# Patient Record
Sex: FEMALE | Race: WHITE | Employment: OTHER | URBAN - METROPOLITAN AREA
[De-identification: names, ages, dates, MRNs, and addresses within clinical notes are randomized per-mention and may not be internally consistent; named-entity substitution may affect disease eponyms.]

---

## 2019-07-01 ENCOUNTER — TELEPHONE (OUTPATIENT)
Dept: CARDIOLOGY | Facility: CLINIC | Age: 69
End: 2019-07-01

## 2019-07-01 NOTE — TELEPHONE ENCOUNTER
[facility] Call Center    Phone Message    May a detailed message be left on voicemail: yes    Reason for Call: Other: Patient referred for pulmonary hypertension, SOB, ref by [facility]    Action Taken: Message routed to:  [facility] Cardio

## 2019-07-05 NOTE — TELEPHONE ENCOUNTER
Contacted pt and scheduled appt.    Argentina Seth  Clinic   Pulmonary Hypertension  [facility]  (P) [phone]

## 2019-07-13 NOTE — PROGRESS NOTES
Emigdio Vann M.D.  Cardiovascular Medicine    I personally saw and examined this patient, discussed care with housestaff and other consultants, reviewed current laboratories and imaging studies, and conveyed impression and diagnostic/therapeutic plan to patient.      Jovita Glover MD   [phone] (Work)  [phone] (Fax)  [address] [place] [address]    Lennox, Shelley M, MD    [address] [place] [address] [phone] (Fax)    Yelena Tripathi MD    [address] [place] [address] [phone] (Fax)    This office note has been dictated.          Past Medical History:     Cancer (     Skin/ Basil cell     Environmental allergies     Heart palpitations 3/3/2014     Brain aneurysm    HTN     Kidney disease     Seizure      due to tetanus     Skin cancer     Stomach problems     lactose intolerance    Past Surgical History:     aneurysm coiling      APPENDECTOMY       DELIVERY     colonoscopy 14     Social History  for ice skating events      Tobacco Use     Smoking status: Former Smoker   Packs/day: 1.00   Years: 2.00   Pack years: 2.00   Types: Cigarettes   Last attempt to quit: 1971   Years since quittin.5     Smokeless tobacco: Never Used   Substance Use Topics     Alcohol use: Yes   Alcohol/week: 1.5 oz   Types: 3 Glasses of wine per week     Family History   Problem Relation Age of Onset     Hyperlipidemia Birth Father     Hypertension Birth Father     Other Birth Mother     depression/anxiety     Depression Birth Mother     Hyperlipidemia Birth Mother     Diabetes Sister     Cancer, Breast Maternal Grandmother     Cancer Brother      BCC/cheek  .    Objective  Constitutional: alert, oriented, normal gait and station, normal mentation.  Oral: moist mucous membranes  Lymph: without pathologic adenopathy  Chest: clear to ausculation and percussion  Cor: No evidence of left or right ventricular  activity.  Rhythm is regular.  S1 normal, S2 split physiologically. Murmurs are not present  Abdomen: without tenderness, rebound, guarding, masses, ascites  Extremities: Edema not present  Neuro: no focal defects, normal mentation  Skin: without open lesions  Psych: oriented, verbal, mental status in tact      Meds  Current Outpatient Medications   Medication     albuterol (PROVENTIL HFA) 108 (90 Base) MCG/ACT inhaler     aspirin (ASA) 325 MG tablet     gabapentin (NEURONTIN) 100 MG capsule     montelukast (SINGULAIR) 10 MG tablet     No current facility-administered medications for this visit.          Labs      Imaging     ECG  Ventricular Rate 65 BPM  Atrial Rate 65 BPM  P-R Interval 148 ms  QRS Duration 78 ms   ms  QTc 413 ms  P Axis -7 degrees  R Axis 20 degrees  T Axis 47 degrees  Result Narrative  Sinus rhythm  Nonspecific T wave abnormality  Abnormal ECG  When compared with ECG of 28-MAR-2014 10:12,  Nonspecific T wave abnormality now evident in Lateral leads  Agree with above Interpretation  Confirmed by MD KEYONA, BUCK SINGLETON ([id number]) on 6/24/2019 7:19:12 AM    Coronary angiogram:     Indications:       Dyspnea, unspecified, Essential (primary)                        hypertension, Palpitations, Family history                        of ischemic heart disease and other                        diseases of the circulatory system,                        Personal history of nicotine dependence         Conclusions        1.  Angiographically normal coronary arteries.         2.  Right heart cath: filling pressures are normal with         borderline/mild pulmonary hypertension.         RA mean 8mmHg        RV 37/9mmHg        PA 37/12, mean 21mmHg        PCWP mean 10mmHg     CT Scan:  EXAM: CT ANGIO CHEST W IV CONT PE STUDY  LOCATION: [facility]  DATE/TIME: 6/17/2019 7:17 PM    INDICATION: Acute onset of dyspnea on exertion.  COMPARISON: Chest CTA 8/17/2010  TECHNIQUE: Helical acquisition through the  chest was performed during the arterial phase of contrast enhancement using IV contrast. 2D and 3D reconstructions were performed by the CT technologist. Dose reduction techniques were used.   IV CONTRAST: IOHEXOL 350 MG/ML IV SOLN 100 mL    FINDINGS:  ANGIOGRAM CHEST: Enlarged central pulmonary arteries with main pulmonary artery measuring 37 mm, suggesting some degree of chronic pulmonary arterial hypertension. No evidence of acute pulmonary embolus. No thoracic aortic aneurysm/dissection.    RV/LV RATIO: N/A    LUNGS AND PLEURA: Biapical fibrosis and bibasilar fibroatelectasis. No focal mass or infiltrate. Central airways are patent.    MEDIASTINUM: No lymphadenopathy. No pleural nor pericardial effusion.    LIMITED UPPER ABDOMEN: Contrast excretion in the upper urinary tracts.  MUSCULOSKELETAL: Negative.    CONCLUSION:  1.  Chest CTA negative for acute pulmonary embolus and thoracic aortic dissection/aneurysm.  2.  Enlarged central pulmonary arteries suggesting some degree of chronic pulmonary arterial hypertension.  Other Result Information  Interface, In Rad Results - 06/17/2019  7:34 PM CDT  EXAM: CT ANGIO CHEST W IV CONT PE STUDY  LOCATION: [facility]  DATE/TIME: 6/17/2019 7:17 PM    INDICATION: Acute onset of dyspnea on exertion.  COMPARISON: Chest CTA 8/17/2010  TECHNIQUE: Helical acquisition through the chest was performed during the arterial phase of contrast enhancement using IV contrast. 2D and 3D reconstructions were performed by the CT technologist. Dose reduction techniques were used.   IV CONTRAST: IOHEXOL 350 MG/ML IV SOLN 100 mL    FINDINGS:  ANGIOGRAM CHEST: Enlarged central pulmonary arteries with main pulmonary artery measuring 37 mm, suggesting some degree of chronic pulmonary arterial hypertension. No evidence of acute pulmonary embolus. No thoracic aortic aneurysm/dissection.    RV/LV RATIO: N/A    LUNGS AND PLEURA: Biapical fibrosis and bibasilar fibroatelectasis. No focal mass or  infiltrate. Central airways are patent.    MEDIASTINUM: No lymphadenopathy. No pleural nor pericardial effusion.    LIMITED UPPER ABDOMEN: Contrast excretion in the upper urinary tracts.    MUSCULOSKELETAL: Negative.    CONCLUSION:  1.  Chest CTA negative for acute pulmonary embolus and thoracic aortic dissection/aneurysm.  2.  Enlarged central pulmonary arteries suggesting some degree of chronic pulmonary arterial hypertension      Echocardiogram:    Clinical Indications:Shortness of breath         CONCLUSION:    Echo 2019-06-17 07:20.     Normal LV and RV size and systolic function.     Mild to moderate LVH.     LVEF 60-65%.     Mild LA dilatation.     Trace to mild AR.       Left Ventricular Ejection Fraction: 60 %        ICD Codes:        Technical Quality:      Patient Vital Signs: Ht HT  68                         Ht(in):                         Wt (lb):211                         BSA:   2.14                         BP:    153  / 75            IV Information:    IV Inserted By:    IV Site:    IV Site Appearance:    IV Size:    IV Removed By:    IV Other:                     2D, Doppler and color flow Doppler study                      performed.           Measurements:    M-MODE    IVS to PW Ratio MM                1                           2D ECHO    Body Height                       68 in                     Body Weight                       211 lb                    Body Surface Area                 2.1 m                     LV Diastolic Diameter Base LX     3.9 cm                3.5-5.7    LV Systolic Diameter Base LX      2.8 cm                2.3-4.9    LV Diastolic Diameter Index       1.8 cm/m                  IVS Diastolic Thickness           1.5 cm                0.6-1.1 cm    LVPW Diastolic Thickness          1.3 cm                0.6-1.1 cm    Aorta at Sinuses Diameter         3.5 cm                    Ascending Aorta Diameter          3.5 cm                    LA Area 4C View                    22.2 cm                   LA Area 2C View                   19.6 cm               <= 20 cm     LA Volume                         67 cm                     LA Volume Index                   31.3 cm /m            16-34 cm /m       DOPPLER    AV Peak Velocity                  132 cm/s                  AV Peak Gradient                  7 mmHg                    AV Mean Gradient                  4 mmHg                    AV Velocity Time Integral         33.3 cm                   LVOT Peak Velocity                114 cm/s                  LVOT Peak Gradient                5.2 mmHg                  LVOT Mean Gradient                3 mmHg                    LVOT Velocity Time Integral       28.3 cm                   LVOT Diameter                     2.3 cm                    LVOT Area                         4.2 cm                    LVOT AV VTI Ratio                 0.85                      AV Stroke Volume                  118 cm                    AV Area Cont Eq vti               3.5 cm                    AV Area Cont Eq pk                3.6 cm                    Mitral E Point Velocity           70 cm/s                   Mitral  A Point Velocity          51.8 cm/s                 Mitral E to A Ratio               1.4                       MV Deceleration Time              349 ms                    PV Peak Velocity                  75.6 cm/s                 PV Peak Gradient                  2.3 mmHg                  LV E' Lateral Velocity            9.8 cm/s                  LV E' Septal Velocity             5.1 cm/s                  Mitral E to LV E' Lateral Ratio   7.1                       Mitral E to LV E' Septal Ratio    13.8                        COLOR DOPPLER    LVOT Diameter                     2.3 cm                    LA Area 4C View                   22.2 cm                   LA Area 2C View                   19.6 cm               <= 20 cm     LA Volume                         67 cm                      LA Volume Index                   31.3 ml/m             16-34 cm /m               Left Ventricle:     Normal LV and RV size and systolic function.                          Mild to moderate concentric LVH. No gross                         regional wall motion abnormalities identified.                         Diastolic filling pattern is indeterminate. LVEF                         60-65%.    Right Ventricle:    Normal RV size and function    Left Atrium:        Mild LA dilatation.    Right Atrium:       Normal RA size.    Aortic Valve:       Trace to mild AR.    Mitral Valve:       Trace to mild MR.    Tricuspid Valve:    Trace to mild TR.    Pulmonic Valve:     Probably normal pulmonic valve.    Aorta:              Aorta is normal in dimension proximally.    Pericardium:        No gross pericardial effusion.    IVC:                Normal IVC.    IAS:                Interatrial septum not well visualized, but                         probably intact.    3D Imaging/Contrast:Two or more contiguous regional walls were                         unable to be seen on pre-contrast images,                         therefore Optison was used on this study.                     Hernan Ji MD, FACC, FASE     (Electronically Signed)     Final Date:17 June 2019 08:38    Other Result Information   Interface, In Card And Rad Results - 06/17/2019  8:39 AM CDT   Contrast: Optison 3.0 ml     Contrast Allergy:NO      Clinical Indications:Shortness of breath      CONCLUSION:    Echo 2019-06-17 07:20.     Normal LV and RV size and systolic function.     Mild to moderate LVH.     LVEF 60-65%.     Mild LA dilatation.     Trace to mild AR.       Left Ventricular Ejection Fraction: 60 %        ICD Codes:        Technical Quality:      Patient Vital Signs: Ht HT  68                         Ht(in):                         Wt (lb):211                         BSA:   2.14                         BP:    153  / 75      IV Information:    IV Inserted  By:    IV Site:    IV Site Appearance:    IV Size:    IV Removed By:    IV Other:                     2D, Doppler and color flow Doppler study                      performed.      Measurements:    M-MODE    IVS to PW Ratio MM                1                           2D ECHO    Body Height                       68 in                     Body Weight                       211 lb                    Body Surface Area                 2.1 m                     LV Diastolic Diameter Base LX     3.9 cm                3.5-5.7    LV Systolic Diameter Base LX      2.8 cm                2.3-4.9    LV Diastolic Diameter Index       1.8 cm/m                  IVS Diastolic Thickness           1.5 cm                0.6-1.1 cm    LVPW Diastolic Thickness          1.3 cm                0.6-1.1 cm    Aorta at Sinuses Diameter         3.5 cm                    Ascending Aorta Diameter          3.5 cm                    LA Area 4C View                   22.2 cm                   LA Area 2C View                   19.6 cm               <= 20 cm     LA Volume                         67 cm                     LA Volume Index                   31.3 cm /m            16-34 cm /m       DOPPLER    AV Peak Velocity                  132 cm/s                  AV Peak Gradient                  7 mmHg                    AV Mean Gradient                  4 mmHg                    AV Velocity Time Integral         33.3 cm                   LVOT Peak Velocity                114 cm/s                  LVOT Peak Gradient                5.2 mmHg                  LVOT Mean Gradient                3 mmHg                    LVOT Velocity Time Integral       28.3 cm                   LVOT Diameter                     2.3 cm                    LVOT Area                         4.2 cm                    LVOT AV VTI Ratio                 0.85                      AV Stroke Volume                  118 cm                    AV Area Cont Eq vti               3.5 cm                     AV Area Cont Eq pk                3.6 cm                    Mitral E Point Velocity           70 cm/s                   Mitral  A Point Velocity          51.8 cm/s                 Mitral E to A Ratio               1.4                       MV Deceleration Time              349 ms                    PV Peak Velocity                  75.6 cm/s                 PV Peak Gradient                  2.3 mmHg                  LV E' Lateral Velocity            9.8 cm/s                  LV E' Septal Velocity             5.1 cm/s                  Mitral E to LV E' Lateral Ratio   7.1                       Mitral E to LV E' Septal Ratio    13.8                        COLOR DOPPLER    LVOT Diameter                     2.3 cm                    LA Area 4C View                   22.2 cm                   LA Area 2C View                   19.6 cm               <= 20 cm     LA Volume                         67 cm                     LA Volume Index                   31.3 ml/m             16-34 cm /m         Left Ventricle:     Normal LV and RV size and systolic function.                          Mild to moderate concentric LVH. No gross                         regional wall motion abnormalities identified.                         Diastolic filling pattern is indeterminate. LVEF                         60-65%.    Right Ventricle:    Normal RV size and function    Left Atrium:        Mild LA dilatation.    Right Atrium:       Normal RA size.    Aortic Valve:       Trace to mild AR.    Mitral Valve:       Trace to mild MR.    Tricuspid Valve:    Trace to mild TR.    Pulmonic Valve:     Probably normal pulmonic valve.    Aorta:              Aorta is normal in dimension proximally.    Pericardium:        No gross pericardial effusion.    IVC:                Normal IVC.    IAS:                Interatrial septum not well visualized, but                         probably intact.    3D Imaging/Contrast:Two or more contiguous  regional walls were                         unable to be seen on pre-contrast images,                         therefore Optison was used on this study.             Hernan Ji MD, FAC, DAVID     (Electronically Signed)     Final Date:17 June 2019 08:38      Stress Echocardiogram:    Contrast:Optison 3.0 ml          Contrast Allergy:NO        Indication: Shortness of breath          CONCLUSION:    Patient exercised 4 minutes 30 seconds on a Mick protocol.     Below normal functional aerobic capacity.     Blunted BP response to exercise.     Shortness of breath with exercise.     No chest pain with exercise.     Positive ECG test for diagnostic ST depression.     No echocardiographic evidence for ischemia.       Given the blunted bp response, dyspnea on exertion and ECG     changes, formal Echo and Cardiology Consultation are     recommended.

## 2019-07-16 ENCOUNTER — PRE VISIT (OUTPATIENT)
Dept: CARDIOLOGY | Facility: CLINIC | Age: 69
End: 2019-07-16

## 2019-07-16 NOTE — TELEPHONE ENCOUNTER
New Pulmonary Hypertension Patient Form   Patient Name:  Nancy Summers Age:  69F [birth year]   Referring Provider:  Dr. Lennox MRN:  [id number]   Date Test Epic Media/Scan Care Everywhere    6/18/19 RHC ?  ?   ?     6/18/19 Angio/Stress ?  ?   ?     6/17/19 Echo ?  ?   ?     6/24/19 EKG ?   ?   ?      6MWT ?   ?   ?     6/20/19 PFT ?   ?   ?     4/5/19 Sleep Study ?  ?   ?     7/8/19 Chest CT ?  ?   ?     7/8/19 V/Q Scan ?   ?   ?     4/6/00 Abd/Liver US ?   ?   ?                                                                  Patient Hx: Chronic cough, Skin cancer, Actinic keratoses, PVC's, Lactose intolerance in adult, Mild persistent asthma without complication, Tremor, Class 1 obesity, Other hyperlipidemia, Dyspnea on exertion     V/Q Normal, CHest CT: new pulmonary nodules which measure up to 5 mm, Right heart catheterization show mPAP 21, Echocardiogram looked ok.

## 2019-07-17 ENCOUNTER — OFFICE VISIT (OUTPATIENT)
Dept: CARDIOLOGY | Facility: CLINIC | Age: 69
End: 2019-07-17
Attending: INTERNAL MEDICINE
Payer: MEDICARE

## 2019-07-17 VITALS
OXYGEN SATURATION: 93 % | DIASTOLIC BLOOD PRESSURE: 76 MMHG | SYSTOLIC BLOOD PRESSURE: 119 MMHG | HEART RATE: 69 BPM | BODY MASS INDEX: 31.46 KG/M2 | WEIGHT: 212.4 LBS | HEIGHT: 69 IN

## 2019-07-17 DIAGNOSIS — Z11.59 ENCOUNTER FOR SCREENING FOR OTHER VIRAL DISEASES: ICD-10-CM

## 2019-07-17 DIAGNOSIS — I27.20 PULMONARY HYPERTENSION (H): ICD-10-CM

## 2019-07-17 DIAGNOSIS — R06.09 DYSPNEA ON EXERTION: ICD-10-CM

## 2019-07-17 DIAGNOSIS — I50.9 HEART FAILURE (H): ICD-10-CM

## 2019-07-17 DIAGNOSIS — I27.20 PULMONARY HYPERTENSION (H): Primary | ICD-10-CM

## 2019-07-17 DIAGNOSIS — I50.42 CHRONIC COMBINED SYSTOLIC AND DIASTOLIC HEART FAILURE (H): ICD-10-CM

## 2019-07-17 LAB
ALBUMIN SERPL-MCNC: 3.9 G/DL (ref 3.4–5)
ALP SERPL-CCNC: 86 U/L (ref 40–150)
ALT SERPL W P-5'-P-CCNC: 28 U/L (ref 0–50)
ANION GAP SERPL CALCULATED.3IONS-SCNC: 5 MMOL/L (ref 3–14)
AST SERPL W P-5'-P-CCNC: 18 U/L (ref 0–45)
BILIRUB DIRECT SERPL-MCNC: <0.1 MG/DL (ref 0–0.2)
BILIRUB SERPL-MCNC: 0.5 MG/DL (ref 0.2–1.3)
BUN SERPL-MCNC: 33 MG/DL (ref 7–30)
CALCIUM SERPL-MCNC: 8.8 MG/DL (ref 8.5–10.1)
CHLORIDE SERPL-SCNC: 107 MMOL/L (ref 94–109)
CHOLEST SERPL-MCNC: 183 MG/DL
CO2 SERPL-SCNC: 27 MMOL/L (ref 20–32)
CREAT SERPL-MCNC: 0.76 MG/DL (ref 0.52–1.04)
CRP SERPL-MCNC: 12.1 MG/L (ref 0–8)
ERYTHROCYTE [DISTWIDTH] IN BLOOD BY AUTOMATED COUNT: 14.5 % (ref 10–15)
GFR SERPL CREATININE-BSD FRML MDRD: 79 ML/MIN/{1.73_M2}
GLUCOSE SERPL-MCNC: 82 MG/DL (ref 70–99)
HCT VFR BLD AUTO: 43 % (ref 35–47)
HDLC SERPL-MCNC: 47 MG/DL
HGB BLD-MCNC: 13.8 G/DL (ref 11.7–15.7)
INR PPP: 0.96 (ref 0.86–1.14)
IRON SATN MFR SERPL: 22 % (ref 15–46)
IRON SERPL-MCNC: 64 UG/DL (ref 35–180)
LDLC SERPL CALC-MCNC: 81 MG/DL
MCH RBC QN AUTO: 28.8 PG (ref 26.5–33)
MCHC RBC AUTO-ENTMCNC: 32.1 G/DL (ref 31.5–36.5)
MCV RBC AUTO: 90 FL (ref 78–100)
NONHDLC SERPL-MCNC: 136 MG/DL
NT-PROBNP SERPL-MCNC: 102 PG/ML (ref 0–125)
PLATELET # BLD AUTO: 251 10E9/L (ref 150–450)
POTASSIUM SERPL-SCNC: 4 MMOL/L (ref 3.4–5.3)
PROT SERPL-MCNC: 7.3 G/DL (ref 6.8–8.8)
RBC # BLD AUTO: 4.8 10E12/L (ref 3.8–5.2)
RHEUMATOID FACT SER NEPH-ACNC: <20 IU/ML (ref 0–20)
SODIUM SERPL-SCNC: 139 MMOL/L (ref 133–144)
TIBC SERPL-MCNC: 288 UG/DL (ref 240–430)
TRIGL SERPL-MCNC: 277 MG/DL
TSH SERPL DL<=0.005 MIU/L-ACNC: 1.98 MU/L (ref 0.4–4)
WBC # BLD AUTO: 8.3 10E9/L (ref 4–11)

## 2019-07-17 PROCEDURE — 84238 ASSAY NONENDOCRINE RECEPTOR: CPT | Performed by: INTERNAL MEDICINE

## 2019-07-17 PROCEDURE — 86706 HEP B SURFACE ANTIBODY: CPT | Performed by: INTERNAL MEDICINE

## 2019-07-17 PROCEDURE — G0499 HEPB SCREEN HIGH RISK INDIV: HCPCS | Performed by: INTERNAL MEDICINE

## 2019-07-17 PROCEDURE — 00000401 ZZHCL STATISTIC THROMBIN TIME NC: Performed by: INTERNAL MEDICINE

## 2019-07-17 PROCEDURE — 86038 ANTINUCLEAR ANTIBODIES: CPT | Performed by: INTERNAL MEDICINE

## 2019-07-17 PROCEDURE — 80076 HEPATIC FUNCTION PANEL: CPT | Performed by: INTERNAL MEDICINE

## 2019-07-17 PROCEDURE — 86431 RHEUMATOID FACTOR QUANT: CPT | Performed by: INTERNAL MEDICINE

## 2019-07-17 PROCEDURE — 85027 COMPLETE CBC AUTOMATED: CPT | Performed by: INTERNAL MEDICINE

## 2019-07-17 PROCEDURE — 85610 PROTHROMBIN TIME: CPT | Performed by: INTERNAL MEDICINE

## 2019-07-17 PROCEDURE — 99204 OFFICE O/P NEW MOD 45 MIN: CPT | Mod: ZP | Performed by: INTERNAL MEDICINE

## 2019-07-17 PROCEDURE — 87389 HIV-1 AG W/HIV-1&-2 AB AG IA: CPT | Performed by: INTERNAL MEDICINE

## 2019-07-17 PROCEDURE — G0472 HEP C SCREEN HIGH RISK/OTHER: HCPCS | Performed by: INTERNAL MEDICINE

## 2019-07-17 PROCEDURE — 85730 THROMBOPLASTIN TIME PARTIAL: CPT | Performed by: INTERNAL MEDICINE

## 2019-07-17 PROCEDURE — 80061 LIPID PANEL: CPT | Performed by: INTERNAL MEDICINE

## 2019-07-17 PROCEDURE — 80048 BASIC METABOLIC PNL TOTAL CA: CPT | Performed by: INTERNAL MEDICINE

## 2019-07-17 PROCEDURE — 83520 IMMUNOASSAY QUANT NOS NONAB: CPT | Performed by: INTERNAL MEDICINE

## 2019-07-17 PROCEDURE — 86704 HEP B CORE ANTIBODY TOTAL: CPT | Performed by: INTERNAL MEDICINE

## 2019-07-17 PROCEDURE — 85613 RUSSELL VIPER VENOM DILUTED: CPT | Performed by: INTERNAL MEDICINE

## 2019-07-17 PROCEDURE — G0463 HOSPITAL OUTPT CLINIC VISIT: HCPCS | Mod: ZF

## 2019-07-17 PROCEDURE — 36415 COLL VENOUS BLD VENIPUNCTURE: CPT | Performed by: INTERNAL MEDICINE

## 2019-07-17 PROCEDURE — 83540 ASSAY OF IRON: CPT | Performed by: INTERNAL MEDICINE

## 2019-07-17 PROCEDURE — 83880 ASSAY OF NATRIURETIC PEPTIDE: CPT | Performed by: INTERNAL MEDICINE

## 2019-07-17 PROCEDURE — 83550 IRON BINDING TEST: CPT | Performed by: INTERNAL MEDICINE

## 2019-07-17 PROCEDURE — 86140 C-REACTIVE PROTEIN: CPT | Performed by: INTERNAL MEDICINE

## 2019-07-17 PROCEDURE — 84443 ASSAY THYROID STIM HORMONE: CPT | Performed by: INTERNAL MEDICINE

## 2019-07-17 RX ORDER — MONTELUKAST SODIUM 10 MG/1
10 TABLET ORAL
COMMUNITY
Start: 2019-02-19 | End: 2020-04-29

## 2019-07-17 RX ORDER — LIDOCAINE 40 MG/G
CREAM TOPICAL
Status: CANCELLED | OUTPATIENT
Start: 2019-07-17

## 2019-07-17 RX ORDER — ASPIRIN 325 MG
650 TABLET ORAL
COMMUNITY
End: 2020-04-29

## 2019-07-17 RX ORDER — GABAPENTIN 100 MG/1
CAPSULE ORAL
COMMUNITY
Start: 2019-06-24

## 2019-07-17 RX ORDER — ALBUTEROL SULFATE 90 UG/1
2 AEROSOL, METERED RESPIRATORY (INHALATION)
COMMUNITY
Start: 2019-06-18 | End: 2019-08-28

## 2019-07-17 ASSESSMENT — PAIN SCALES - GENERAL: PAINLEVEL: NO PAIN (0)

## 2019-07-17 ASSESSMENT — MIFFLIN-ST. JEOR: SCORE: 1544.88

## 2019-07-17 NOTE — PATIENT INSTRUCTIONS
Medication Changes:   No medication changes at this time. Please continue current medication regiment.    Patient Instructions:  1. Continue staying active and eat a heart healthy diet.    2. Please keep current list of medications with you at all times.    3. Remember to weigh yourself daily after voiding and before you consume any food or beverages and log the numbers.  If you have gained 2 pounds overnight or 5 pounds in a week contact us immediately for medication adjustments or further instructions.    4. **Please call us immediately if you have any syncope (fainting or passing out), chest pain, edema (swelling or weight gain), or decline in your functional status (general decline in how you are feeling).    **Labs today    Check in [address] [place] [id number]  Check-In  Time Check-In Location Estimated Length Procedure   Name     8/21/19  10:30 AM   [facility]  waiting room 60-90 minutes (4 hr total) Right Heart Catheterization with Exercise**     Procedure Preparations & Instructions     This is an invasive procedure that DOES require preparation:    - Nothing to eat for 6 hours   - You may have clear liquids up until the time of your procedure  - A ride should be arranged for you in the instance you are unable to drive home, however you should be able to function as you normally would after the procedure  **Please wear comfortable shorts for this test     Follow up Appointment Information:  8/28 at 12:00 Follow up after testing with Dr. Vann      We are located on the third floor [facility] on the [facility].  Our address is     [address] [place] [address] [place] [address] [place] [address]      Thank you for allowing us to be a part of your care here at the [facility]    If you have questions or concerns please contact us at:    Cecilia Mills, RN, BSN    Farooq Hernández or Argentina Seth (Schedule,Prior Auth)  Nurse  Coordinator     Clinic   Pulmonary Hypertension   Pulmonary Hypertension  [facility]  (P)[phone]    (P) [phone]        (F)[phone]                ** Please note that you will NOT receive a reminder call regarding your scheduled testing, reminder calls are for provider appointments only.  If you are scheduled for testing within the [facility] system you may receive a call regarding pre-registration for billing purposes only.**     Support Group:  Pulmonary Hypertension Association  Https://www.phassociation.org/  **Look at the Events Tab** They even have Support Groups that you can call into    [facility] PH Support Group  Second Saturday of the Month from 1-3 PM   Location: [address] [place] [address]  Leader: Kitty Segura and Lashae Dozier  Phone: [phone] or [phone]  Email: [email or web address]

## 2019-07-17 NOTE — NURSING NOTE
Vitals completed successfully and medication reconciled.     Yelena Contreras CMA  8:02 AM  Chief Complaint   Patient presents with     New Patient     New PH referral from Dr. Lennox

## 2019-07-17 NOTE — PROGRESS NOTES
Service Date: 2019             Shelley Lennox, MD    [facility] [address] [place] [address]      RE:    Nancy Summers   MRN:  [id number]   :  1950      Dear Laurel:      Thank you very much for allowing me to see Nancy Summers, a charming 69-year-old white female who has a fairly unremarkable medical history until May when she noticed that she was markedly more short of breath [facility] [place]  There were approximately 129 steps (personal testimony).        She has no clear-cut history of pulmonary embolism, deep venous thrombosis or risk factors for same.  She has no history of collagen vascular disease.  She has no history of liver disease or myeloproliferative syndrome.  There is no history of diet drug usage, over-the-counter medications or recreational drug abuse. She has no history of exertionally related chest pain and right and left heart catheterization with selective coronary arteriography was performed and was normal at rest.  High high-resolution CT scan showed some new small 5 mm nodules as well as apical scarring.        Her pulmonary history is significant for a prolonged upper respiratory infection while touring [place] many years ago.  Subsequent to that she experienced a chronic cough.  There is a questionable history of asthma and she has used inhalers and treatment such that her cough has dissipated with treatment.  Her CT scan did show air trapping consistent with asthma.  She has no significant history of occupational or tobacco exposure.  She has no clear-cut history of aspiration or GERD.        Interestingly, she was recently diagnosed with obstructive sleep apnea early this spring and placed on CPAP.  Coincidentally, her shortness of breath began a month later.      The very thorough workup was carried out at [facility], which was good in the sense that it disclosed no  abnormalities, however, leaves her symptoms, unexplained.  She was noted to have evidence of concentric left ventricular hypertrophy.  Given her weight, gender, age and such, this would make one suspicious for possible heart failure with preserved ejection fraction.  She has gained approximately 20 pounds over the year.  She feels that she has gained weight, around her midline.  There was no ascites noted on the CT scan.        ASSESSMENT AND PLAN:     The patient has symptoms of distinct exercise intolerance, confirmed by a Mick protocol exercise test.  However, we have not established an etiology for this.  She has no evidence of pulmonary emboli by CT scan or ventilation perfusion lung scan.  Serologies do not suggest an autoimmune or inflammatory etiology.  I suggested that she undergo right heart catheterization with EXERCISE in order to look for exercise-induced pulmonary arterial hypertension and/or a disproportionate increase in systolic blood pressure with exercise and/or evidence or indirect evidence of diastolic relaxation abnormalities.  I discussed the risks and benefits of right heart catheterization in detail.  She wishes to proceed.      Thank you very much for allowing us to see her.      Sincerely yours,         Emigdio Vann MD      cc:   Yelena Tripathi MD   [facility]   [address] [place] [address]         EMIGDIO VANN MD             D: 2019   T: 2019   MT: ROCK      Name:     JUSTIN BYRNES   MRN:      [id number]        Account:      [id number]   :      1950           Service Date: 2019      Document: [id number]

## 2019-07-17 NOTE — NURSING NOTE
Right Heart Catheterization: Patient was instructed regarding right heart catheterization, including discussion of the procedure, preparation, intra-procedural steps, and recovery at home. Patient demonstrated understanding of this information and agreed to call with further questions or concerns.  Med Reconcile: Reviewed and verified all current medications with the patient. The updated medication list was printed and given to the patient.  Diet: Patient instructed regarding a heart healthy diet, including discussion of reduced fat and sodium intake. Patient demonstrated understanding of this information and agreed to call with further questions or concerns.  Return Appointment: Patient given instructions regarding scheduling next clinic visit. Patient demonstrated understanding of this information and agreed to call with further questions or concerns.  Patient stated she understood all health information given and agreed to call with further questions or concerns.     Medication Changes:   No medication changes at this time. Please continue current medication regiment.    Patient Instructions:  1. Continue staying active and eat a heart healthy diet.    2. Please keep current list of medications with you at all times.    3. Remember to weigh yourself daily after voiding and before you consume any food or beverages and log the numbers.  If you have gained 2 pounds overnight or 5 pounds in a week contact us immediately for medication adjustments or further instructions.    4. **Please call us immediately if you have any syncope (fainting or passing out), chest pain, edema (swelling or weight gain), or decline in your functional status (general decline in how you are feeling).    **Labs today    Check-In  Time Check-In Location Estimated Length Procedure   Name        GOLD  waiting room 60-90 minutes Right Heart Catheterization with Exercise**     Procedure Preparations & Instructions     This is an invasive  procedure that DOES require preparation:    - Nothing to eat for 6 hours   - You may have clear liquids up until the time of your procedure  - A ride should be arranged for you in the instance you are unable to drive home, however you should be able to function as you normally would after the procedure  **Please wear comfortable shorts for this test     Follow up Appointment Information:  Follow up after

## 2019-07-17 NOTE — LETTER
2019      RE: Nancy Summers  [address]  [facility] [address]       Dear Colleague,    Thank you for the opportunity to participate in the care of your patient, Nancy Summers, at the [facility]. Please see a copy of my visit note below.    Emigdio Vann M.D.  Cardiovascular Medicine    I personally saw and examined this patient, discussed care with housestaff and other consultants, reviewed current laboratories and imaging studies, and conveyed impression and diagnostic/therapeutic plan to patient.      Jovita Glover MD   [phone] (Work)  [phone] (Fax)  [address] [place] [address]    Lennox, Shelley M, MD    [address] [place] [address] [phone] (Fax)    Yelena Tripathi MD    [address] [place] [address] [phone] (Fax)    This office note has been dictated.          Past Medical History:     Cancer (     Skin/ Basil cell     Environmental allergies     Heart palpitations 3/3/2014     Brain aneurysm    HTN     Kidney disease     Seizure      due to tetanus     Skin cancer     Stomach problems     lactose intolerance    Past Surgical History:     aneurysm coiling      APPENDECTOMY       DELIVERY     colonoscopy 14     Social History  for ice skating events      Tobacco Use     Smoking status: Former Smoker   Packs/day: 1.00   Years: 2.00   Pack years: 2.00   Types: Cigarettes   Last attempt to quit: 1971   Years since quittin.5     Smokeless tobacco: Never Used   Substance Use Topics     Alcohol use: Yes   Alcohol/week: 1.5 oz   Types: 3 Glasses of wine per week     Family History   Problem Relation Age of Onset     Hyperlipidemia Birth Father     Hypertension Birth Father     Other Birth Mother     depression/anxiety     Depression Birth Mother     Hyperlipidemia Birth Mother     Diabetes Sister      Cancer, Breast Maternal Grandmother     Cancer Brother      BCC/cheek  .    Objective  Constitutional: alert, oriented, normal gait and station, normal mentation.  Oral: moist mucous membranes  Lymph: without pathologic adenopathy  Chest: clear to ausculation and percussion  Cor: No evidence of left or right ventricular activity.  Rhythm is regular.  S1 normal, S2 split physiologically. Murmurs are not present  Abdomen: without tenderness, rebound, guarding, masses, ascites  Extremities: Edema not present  Neuro: no focal defects, normal mentation  Skin: without open lesions  Psych: oriented, verbal, mental status in tact      Meds  Current Outpatient Medications   Medication     albuterol (PROVENTIL HFA) 108 (90 Base) MCG/ACT inhaler     aspirin (ASA) 325 MG tablet     gabapentin (NEURONTIN) 100 MG capsule     montelukast (SINGULAIR) 10 MG tablet     No current facility-administered medications for this visit.          Labs      Imaging     ECG  Ventricular Rate 65 BPM  Atrial Rate 65 BPM  P-R Interval 148 ms  QRS Duration 78 ms   ms  QTc 413 ms  P Axis -7 degrees  R Axis 20 degrees  T Axis 47 degrees  Result Narrative  Sinus rhythm  Nonspecific T wave abnormality  Abnormal ECG  When compared with ECG of 28-MAR-2014 10:12,  Nonspecific T wave abnormality now evident in Lateral leads  Agree with above Interpretation  Confirmed by MD KEYONA, BUCK SINGLETON ([id number]) on 6/24/2019 7:19:12 AM    Coronary angiogram:     Indications:       Dyspnea, unspecified, Essential (primary)                        hypertension, Palpitations, Family history                        of ischemic heart disease and other                        diseases of the circulatory system,                        Personal history of nicotine dependence         Conclusions        1.  Angiographically normal coronary arteries.         2.  Right heart cath: filling pressures are normal with         borderline/mild pulmonary hypertension.         RA mean  8mmHg        RV 37/9mmHg        PA 37/12, mean 21mmHg        PCWP mean 10mmHg     CT Scan:  EXAM: CT ANGIO CHEST W IV CONT PE STUDY  LOCATION: [facility]  DATE/TIME: 6/17/2019 7:17 PM    INDICATION: Acute onset of dyspnea on exertion.  COMPARISON: Chest CTA 8/17/2010  TECHNIQUE: Helical acquisition through the chest was performed during the arterial phase of contrast enhancement using IV contrast. 2D and 3D reconstructions were performed by the CT technologist. Dose reduction techniques were used.   IV CONTRAST: IOHEXOL 350 MG/ML IV SOLN 100 mL    FINDINGS:  ANGIOGRAM CHEST: Enlarged central pulmonary arteries with main pulmonary artery measuring 37 mm, suggesting some degree of chronic pulmonary arterial hypertension. No evidence of acute pulmonary embolus. No thoracic aortic aneurysm/dissection.    RV/LV RATIO: N/A    LUNGS AND PLEURA: Biapical fibrosis and bibasilar fibroatelectasis. No focal mass or infiltrate. Central airways are patent.    MEDIASTINUM: No lymphadenopathy. No pleural nor pericardial effusion.    LIMITED UPPER ABDOMEN: Contrast excretion in the upper urinary tracts.  MUSCULOSKELETAL: Negative.    CONCLUSION:  1.  Chest CTA negative for acute pulmonary embolus and thoracic aortic dissection/aneurysm.  2.  Enlarged central pulmonary arteries suggesting some degree of chronic pulmonary arterial hypertension.  Other Result Information  Interface, In Rad Results - 06/17/2019  7:34 PM CDT  EXAM: CT ANGIO CHEST W IV CONT PE STUDY  LOCATION: [facility]  DATE/TIME: 6/17/2019 7:17 PM    INDICATION: Acute onset of dyspnea on exertion.  COMPARISON: Chest CTA 8/17/2010  TECHNIQUE: Helical acquisition through the chest was performed during the arterial phase of contrast enhancement using IV contrast. 2D and 3D reconstructions were performed by the CT technologist. Dose reduction techniques were used.   IV CONTRAST: IOHEXOL 350 MG/ML IV SOLN 100 mL    FINDINGS:  ANGIOGRAM CHEST: Enlarged central  pulmonary arteries with main pulmonary artery measuring 37 mm, suggesting some degree of chronic pulmonary arterial hypertension. No evidence of acute pulmonary embolus. No thoracic aortic aneurysm/dissection.    RV/LV RATIO: N/A    LUNGS AND PLEURA: Biapical fibrosis and bibasilar fibroatelectasis. No focal mass or infiltrate. Central airways are patent.    MEDIASTINUM: No lymphadenopathy. No pleural nor pericardial effusion.    LIMITED UPPER ABDOMEN: Contrast excretion in the upper urinary tracts.    MUSCULOSKELETAL: Negative.    CONCLUSION:  1.  Chest CTA negative for acute pulmonary embolus and thoracic aortic dissection/aneurysm.  2.  Enlarged central pulmonary arteries suggesting some degree of chronic pulmonary arterial hypertension      Echocardiogram:    Clinical Indications:Shortness of breath         CONCLUSION:    Echo 2019-06-17 07:20.     Normal LV and RV size and systolic function.     Mild to moderate LVH.     LVEF 60-65%.     Mild LA dilatation.     Trace to mild AR.       Left Ventricular Ejection Fraction: 60 %        ICD Codes:        Technical Quality:      Patient Vital Signs: Ht HT  68                         Ht(in):                         Wt (lb):211                         BSA:   2.14                         BP:    153  / 75            IV Information:    IV Inserted By:    IV Site:    IV Site Appearance:    IV Size:    IV Removed By:    IV Other:                     2D, Doppler and color flow Doppler study                      performed.           Measurements:    M-MODE    IVS to PW Ratio MM                1                           2D ECHO    Body Height                       68 in                     Body Weight                       211 lb                    Body Surface Area                 2.1 m                     LV Diastolic Diameter Base LX     3.9 cm                3.5-5.7    LV Systolic Diameter Base LX      2.8 cm                2.3-4.9    LV Diastolic Diameter Index       1.8  cm/m                  IVS Diastolic Thickness           1.5 cm                0.6-1.1 cm    LVPW Diastolic Thickness          1.3 cm                0.6-1.1 cm    Aorta at Sinuses Diameter         3.5 cm                    Ascending Aorta Diameter          3.5 cm                    LA Area 4C View                   22.2 cm                   LA Area 2C View                   19.6 cm               <= 20 cm     LA Volume                         67 cm                     LA Volume Index                   31.3 cm /m            16-34 cm /m       DOPPLER    AV Peak Velocity                  132 cm/s                  AV Peak Gradient                  7 mmHg                    AV Mean Gradient                  4 mmHg                    AV Velocity Time Integral         33.3 cm                   LVOT Peak Velocity                114 cm/s                  LVOT Peak Gradient                5.2 mmHg                  LVOT Mean Gradient                3 mmHg                    LVOT Velocity Time Integral       28.3 cm                   LVOT Diameter                     2.3 cm                    LVOT Area                         4.2 cm                    LVOT AV VTI Ratio                 0.85                      AV Stroke Volume                  118 cm                    AV Area Cont Eq vti               3.5 cm                    AV Area Cont Eq pk                3.6 cm                    Mitral E Point Velocity           70 cm/s                   Mitral  A Point Velocity          51.8 cm/s                 Mitral E to A Ratio               1.4                       MV Deceleration Time              349 ms                    PV Peak Velocity                  75.6 cm/s                 PV Peak Gradient                  2.3 mmHg                  LV E' Lateral Velocity            9.8 cm/s                  LV E' Septal Velocity             5.1 cm/s                  Mitral E to LV E' Lateral Ratio   7.1                       Mitral E to  LV E' Septal Ratio    13.8                        COLOR DOPPLER    LVOT Diameter                     2.3 cm                    LA Area 4C View                   22.2 cm                   LA Area 2C View                   19.6 cm               <= 20 cm     LA Volume                         67 cm                     LA Volume Index                   31.3 ml/m             16-34 cm /m               Left Ventricle:     Normal LV and RV size and systolic function.                          Mild to moderate concentric LVH. No gross                         regional wall motion abnormalities identified.                         Diastolic filling pattern is indeterminate. LVEF                         60-65%.    Right Ventricle:    Normal RV size and function    Left Atrium:        Mild LA dilatation.    Right Atrium:       Normal RA size.    Aortic Valve:       Trace to mild AR.    Mitral Valve:       Trace to mild MR.    Tricuspid Valve:    Trace to mild TR.    Pulmonic Valve:     Probably normal pulmonic valve.    Aorta:              Aorta is normal in dimension proximally.    Pericardium:        No gross pericardial effusion.    IVC:                Normal IVC.    IAS:                Interatrial septum not well visualized, but                         probably intact.    3D Imaging/Contrast:Two or more contiguous regional walls were                         unable to be seen on pre-contrast images,                         therefore Optison was used on this study.                     Hernan Ji MD, FACC, FASE     (Electronically Signed)     Final Date:17 June 2019 08:38    Other Result Information   Interface, In Card And Rad Results - 06/17/2019  8:39 AM CDT   Contrast: Optison 3.0 ml     Contrast Allergy:NO      Clinical Indications:Shortness of breath      CONCLUSION:    Echo 2019-06-17 07:20.     Normal LV and RV size and systolic function.     Mild to moderate LVH.     LVEF 60-65%.     Mild LA dilatation.     Trace to  mild AR.       Left Ventricular Ejection Fraction: 60 %        ICD Codes:        Technical Quality:      Patient Vital Signs: Ht HT  68                         Ht(in):                         Wt (lb):211                         BSA:   2.14                         BP:    153  / 75      IV Information:    IV Inserted By:    IV Site:    IV Site Appearance:    IV Size:    IV Removed By:    IV Other:                     2D, Doppler and color flow Doppler study                      performed.      Measurements:    M-MODE    IVS to PW Ratio MM                1                           2D ECHO    Body Height                       68 in                     Body Weight                       211 lb                    Body Surface Area                 2.1 m                     LV Diastolic Diameter Base LX     3.9 cm                3.5-5.7    LV Systolic Diameter Base LX      2.8 cm                2.3-4.9    LV Diastolic Diameter Index       1.8 cm/m                  IVS Diastolic Thickness           1.5 cm                0.6-1.1 cm    LVPW Diastolic Thickness          1.3 cm                0.6-1.1 cm    Aorta at Sinuses Diameter         3.5 cm                    Ascending Aorta Diameter          3.5 cm                    LA Area 4C View                   22.2 cm                   LA Area 2C View                   19.6 cm               <= 20 cm     LA Volume                         67 cm                     LA Volume Index                   31.3 cm /m            16-34 cm /m       DOPPLER    AV Peak Velocity                  132 cm/s                  AV Peak Gradient                  7 mmHg                    AV Mean Gradient                  4 mmHg                    AV Velocity Time Integral         33.3 cm                   LVOT Peak Velocity                114 cm/s                  LVOT Peak Gradient                5.2 mmHg                  LVOT Mean Gradient                3 mmHg                    LVOT Velocity Time  Integral       28.3 cm                   LVOT Diameter                     2.3 cm                    LVOT Area                         4.2 cm                    LVOT AV VTI Ratio                 0.85                      AV Stroke Volume                  118 cm                    AV Area Cont Eq vti               3.5 cm                    AV Area Cont Eq pk                3.6 cm                    Mitral E Point Velocity           70 cm/s                   Mitral  A Point Velocity          51.8 cm/s                 Mitral E to A Ratio               1.4                       MV Deceleration Time              349 ms                    PV Peak Velocity                  75.6 cm/s                 PV Peak Gradient                  2.3 mmHg                  LV E' Lateral Velocity            9.8 cm/s                  LV E' Septal Velocity             5.1 cm/s                  Mitral E to LV E' Lateral Ratio   7.1                       Mitral E to LV E' Septal Ratio    13.8                        COLOR DOPPLER    LVOT Diameter                     2.3 cm                    LA Area 4C View                   22.2 cm                   LA Area 2C View                   19.6 cm               <= 20 cm     LA Volume                         67 cm                     LA Volume Index                   31.3 ml/m             16-34 cm /m         Left Ventricle:     Normal LV and RV size and systolic function.                          Mild to moderate concentric LVH. No gross                         regional wall motion abnormalities identified.                         Diastolic filling pattern is indeterminate. LVEF                         60-65%.    Right Ventricle:    Normal RV size and function    Left Atrium:        Mild LA dilatation.    Right Atrium:       Normal RA size.    Aortic Valve:       Trace to mild AR.    Mitral Valve:       Trace to mild MR.    Tricuspid Valve:    Trace to mild TR.    Pulmonic Valve:     Probably normal  pulmonic valve.    Aorta:              Aorta is normal in dimension proximally.    Pericardium:        No gross pericardial effusion.    IVC:                Normal IVC.    IAS:                Interatrial septum not well visualized, but                         probably intact.    3D Imaging/Contrast:Two or more contiguous regional walls were                         unable to be seen on pre-contrast images,                         therefore Optison was used on this study.             Hernan Ji MD, FAC, FASE     (Electronically Signed)     Final Date:2019 08:38      Stress Echocardiogram:    Contrast:Optison 3.0 ml          Contrast Allergy:NO        Indication: Shortness of breath          CONCLUSION:    Patient exercised 4 minutes 30 seconds on a Mick protocol.     Below normal functional aerobic capacity.     Blunted BP response to exercise.     Shortness of breath with exercise.     No chest pain with exercise.     Positive ECG test for diagnostic ST depression.     No echocardiographic evidence for ischemia.       Given the blunted bp response, dyspnea on exertion and ECG     changes, formal Echo and Cardiology Consultation are     recommended.        Service Date: 2019             Shelley Lennox, MD    [facility] [address] [place] [address]      RE:    Nancy Summers   MRN:  [id number]   :  1950      Dear Laurel:      Thank you very much for allowing me to see Nancy Summers, a charming 69-year-old white female who has a fairly unremarkable medical history until May when she noticed that she was markedly more short of breath walking [place].  There were approximately 129 steps (personal testimony).        She has no clear-cut history of pulmonary embolism, deep venous thrombosis or risk factors for same.  She has no history of collagen vascular disease.  She has no history of liver  disease or myeloproliferative syndrome.  There is no history of diet drug usage, over-the-counter medications or recreational drug abuse. She has no history of exertionally related chest pain and right and left heart catheterization with selective coronary arteriography was performed and was normal at rest.  High high-resolution CT scan showed some new small 5 mm nodules as well as apical scarring.        Her pulmonary history is significant for a prolonged upper respiratory infection while touring [place] many years ago.  Subsequent to that she experienced a chronic cough.  There is a questionable history of asthma and she has used inhalers and treatment such that her cough has dissipated with treatment.  Her CT scan did show air trapping consistent with asthma.  She has no significant history of occupational or tobacco exposure.  She has no clear-cut history of aspiration or GERD.        Interestingly, she was recently diagnosed with obstructive sleep apnea early this spring and placed on CPAP.  Coincidentally, her shortness of breath began a month later.      The very thorough workup was carried out at [facility], which was good in the sense that it disclosed no abnormalities, however, leaves her symptoms, unexplained.  She was noted to have evidence of concentric left ventricular hypertrophy.  Given her weight, gender, age and such, this would make one suspicious for possible heart failure with preserved ejection fraction.  She has gained approximately 20 pounds over the year.  She feels that she has gained weight, around her midline.  There was no ascites noted on the CT scan.        ASSESSMENT AND PLAN:     The patient has symptoms of distinct exercise intolerance, confirmed by a Mick protocol exercise test.  However, we have not established an etiology for this.  She has no evidence of pulmonary emboli by CT scan or ventilation perfusion lung scan.  Serologies do not suggest an autoimmune or inflammatory  etiology.  I suggested that she undergo right heart catheterization with EXERCISE in order to look for exercise-induced pulmonary arterial hypertension and/or a disproportionate increase in systolic blood pressure with exercise and/or evidence or indirect evidence of diastolic relaxation abnormalities.  I discussed the risks and benefits of right heart catheterization in detail.  She wishes to proceed.      Thank you very much for allowing us to see her.      Sincerely yours,         Arminda Vann MD      cc:   Yelena Tripathi MD   [facility]   [address] [place] [address]         ARMINDA VANN MD             D: 2019   T: 2019   MT: ROCK      Name:     JUSTIN BYRNES   MRN:      [id number]        Account:      [id number]   :      1950           Service Date: 2019      Document: [id number]

## 2019-07-18 LAB
ANA SER QL IF: NEGATIVE
HBV CORE AB SERPL QL IA: NONREACTIVE
HBV SURFACE AB SERPL IA-ACNC: 0 M[IU]/ML
HBV SURFACE AG SERPL QL IA: NONREACTIVE
HCV AB SERPL QL IA: NONREACTIVE
HIV 1+2 AB+HIV1 P24 AG SERPL QL IA: NONREACTIVE
LA PPP-IMP: NEGATIVE
STFR SERPL-SCNC: 2.6 MG/L (ref 1.9–4.4)

## 2019-07-19 LAB — IL6 SERPL-MCNC: 1.92 PG/ML

## 2019-08-13 DIAGNOSIS — R79.9 ABNORMAL FINDING OF BLOOD CHEMISTRY: ICD-10-CM

## 2019-08-13 DIAGNOSIS — I27.20 PULMONARY HYPERTENSION (H): Primary | ICD-10-CM

## 2019-08-21 ENCOUNTER — HOSPITAL ENCOUNTER (OUTPATIENT)
Facility: CLINIC | Age: 69
Discharge: HOME | End: 2019-08-21
Attending: INTERNAL MEDICINE | Admitting: INTERNAL MEDICINE
Payer: MEDICARE

## 2019-08-21 ENCOUNTER — APPOINTMENT (OUTPATIENT)
Dept: MEDSURG UNIT | Facility: CLINIC | Age: 69
End: 2019-08-21
Attending: INTERNAL MEDICINE
Payer: MEDICARE

## 2019-08-21 VITALS
HEIGHT: 68 IN | SYSTOLIC BLOOD PRESSURE: 177 MMHG | DIASTOLIC BLOOD PRESSURE: 76 MMHG | BODY MASS INDEX: 32.13 KG/M2 | WEIGHT: 212 LBS | RESPIRATION RATE: 20 BRPM | HEART RATE: 80 BPM | OXYGEN SATURATION: 94 %

## 2019-08-21 DIAGNOSIS — I27.20 PULMONARY HYPERTENSION (H): ICD-10-CM

## 2019-08-21 DIAGNOSIS — R79.9 ABNORMAL FINDING OF BLOOD CHEMISTRY: ICD-10-CM

## 2019-08-21 LAB
CHOLEST SERPL-MCNC: 181 MG/DL
CRP SERPL-MCNC: 15 MG/L (ref 0–8)
HDLC SERPL-MCNC: 43 MG/DL
LDLC SERPL CALC-MCNC: 91 MG/DL
NONHDLC SERPL-MCNC: 138 MG/DL
TRIGL SERPL-MCNC: 236 MG/DL

## 2019-08-21 PROCEDURE — 93464 EXERCISE W/HEMODYNAMIC MEAS: CPT | Mod: 26 | Performed by: INTERNAL MEDICINE

## 2019-08-21 PROCEDURE — 93451 RIGHT HEART CATH: CPT | Performed by: INTERNAL MEDICINE

## 2019-08-21 PROCEDURE — 27210788 ZZH MANIFOLD CR3: Performed by: INTERNAL MEDICINE

## 2019-08-21 PROCEDURE — C1894 INTRO/SHEATH, NON-LASER: HCPCS | Performed by: INTERNAL MEDICINE

## 2019-08-21 PROCEDURE — 80061 LIPID PANEL: CPT | Performed by: INTERNAL MEDICINE

## 2019-08-21 PROCEDURE — 25000125 ZZHC RX 250: Performed by: INTERNAL MEDICINE

## 2019-08-21 PROCEDURE — 86140 C-REACTIVE PROTEIN: CPT | Performed by: INTERNAL MEDICINE

## 2019-08-21 PROCEDURE — 93451 RIGHT HEART CATH: CPT | Mod: 26 | Performed by: INTERNAL MEDICINE

## 2019-08-21 PROCEDURE — 36415 COLL VENOUS BLD VENIPUNCTURE: CPT | Performed by: INTERNAL MEDICINE

## 2019-08-21 PROCEDURE — 27210794 ZZH OR GENERAL SUPPLY STERILE: Performed by: INTERNAL MEDICINE

## 2019-08-21 PROCEDURE — 93464 EXERCISE W/HEMODYNAMIC MEAS: CPT | Performed by: INTERNAL MEDICINE

## 2019-08-21 PROCEDURE — 40000166 ZZH STATISTIC PP CARE STAGE 1

## 2019-08-21 RX ORDER — LIDOCAINE 40 MG/G
CREAM TOPICAL
Status: COMPLETED | OUTPATIENT
Start: 2019-08-21 | End: 2019-08-21

## 2019-08-21 RX ADMIN — LIDOCAINE: 40 CREAM TOPICAL at 11:08

## 2019-08-21 ASSESSMENT — MIFFLIN-ST. JEOR: SCORE: 1535.13

## 2019-08-21 NOTE — IP AVS SNAPSHOT
MRN:[id number]                      After Visit Summary   8/21/2019    Nancy Summers    MRN: [id number]           Visit Information        Department      8/21/2019 10:37 AM [address] [facility]          Review of your medicines      UNREVIEWED medicines. Ask your doctor about these medicines       Dose / Directions   aspirin 325 MG tablet  Commonly known as:  ASA      Dose:  650 mg  Take 650 mg by mouth  Refills:  0     gabapentin 100 MG capsule  Commonly known as:  NEURONTIN      TAKE 1 CAPSULE BY MOUTH AT BEDTIME AS NEEDED.  Refills:  0     montelukast 10 MG tablet  Commonly known as:  SINGULAIR      Dose:  10 mg  Take 10 mg by mouth  Refills:  0     PROVENTIL  (90 Base) MCG/ACT inhaler  Generic drug:  albuterol      Dose:  2 puff  Inhale 2 puffs into the lungs  Refills:  0              Protect others around you: Learn how to safely use, store and throw away your medicines at www.disposemymeds.org.       Follow-ups after your visit       Your next 10 appointments already scheduled    Aug 21, 2019  Procedure with Ana Lilia Sung MD  [facility], [facility],  Heart Cath Lab ([facility], [facility]) [address] [place] [id number] [phone]   [facility] is located on the corner of [facility] and [facility] on the [facility]. It is easily accessible from virtually any point in the [place] area, via [address] and [address].   Aug 28, 2019 12:00 PM CDT  (Arrive by 11:45 AM)  RETURN PRIMARY PULMONARY with Emigdio Vann MD  [facility] ([facility]) [address] [place] [address]  [address] [place] [id number] [phone]         Care Instructions       Further instructions from your care team       [facility]                        Interventional Cardiology  Discharge Instructions   Post Right Heart Cath      AFTER YOU GO HOME:    DO drink plenty  of fluids    DO resume your regular diet and medications unless otherwise instructed by your Primary Physician    Do Not scrub the procedure site vigorously    No lotion or powder to the puncture site for 3 days    CALL YOUR PRIMARY PHYSICIAN IF: You may resume all normal activity.  Monitor neck site for bleeding, swelling, or voice changes. If you notice bleeding or swelling immediately apply pressure to the site and call number below to speak with Cardiology Fellow.  If you experience any changes in your breathing you should call your doctor immediately or come to the closest Emergency Department.  Do not drive yourself.    ADDITIONAL INSTRUCTIONS: Medications: You are to resume all home medications including anticoagulation therapy unless otherwise advised by your primary cardiologist or nurse coordinator.    Follow Up: Per your primary cardiology team    If you have any questions or concerns regarding your procedure site please call [phone] at anytime and ask for Cardiology Fellow on call.  They are available 24 hours a day.  You may also contact the Cardiology Clinic after hours number at [phone].                                                       Telephone Numbers [phone] Monday-Friday 8:00 am to 4:30 pm    [phone] After 4:30 pm Monday-Friday, Weekends & Holidays  Ask for Interventional Cardiologist on call. Someone is on call 24 hours/day   [facility] toll free number [phone] Monday-Friday 8:00 am to 4:30 pm   [facility] Emergency Dept [phone]                   Additional Information About Your Visit       [facility] Information    [facility] gives you secure access to your electronic health record. If you see a primary care provider, you can also send messages to your care team and make appointments. If you have questions, please call your primary care clinic.  If you do not have a primary care provider, please call [phone] and they will assist you.       Care EveryWhere  ID    This is your Care EveryWhere ID. This could be used by other organizations to access your [facility] medical records  [id number]        Primary Care Provider Office Phone # Fax #    Yelena Tripathi [phone]      Equal Access to Services    NEDRAColorado River Medical CenterARIANA : Hadii funmilayo vizcaino hadasho Soomaali, waaxda luqadaha, qaybta kaalmada adeegyada, waxlukasz altman haybrian reisvikashoa serrano. So [place] [phone].    ATENCIÓN: Si habla español, tiene a lopez disposición servicios gratuitos de asistencia lingüística. Llame al [phone].    We comply with applicable federal civil rights laws and [place] laws. We do not discriminate on the basis of race, color, national origin, age, disability, sex, sexual orientation, or gender identity.           Thank you!    Thank you for choosing [facility] for your care. Our goal is always to provide you with excellent care. Hearing back from our patients is one way we can continue to improve our services. Please take a few minutes to complete the written survey that you may receive in the mail after you visit with us. Thank you!            Medication List      ASK your doctor about these medications          Morning Afternoon Evening Bedtime As Needed    aspirin 325 MG tablet  Also known as:  ASA  INSTRUCTIONS:  Take 650 mg by mouth                     gabapentin 100 MG capsule  Also known as:  NEURONTIN  INSTRUCTIONS:  TAKE 1 CAPSULE BY MOUTH AT BEDTIME AS NEEDED.                     montelukast 10 MG tablet  Also known as:  SINGULAIR  INSTRUCTIONS:  Take 10 mg by mouth                     PROVENTIL  (90 Base) MCG/ACT inhaler  INSTRUCTIONS:  Inhale 2 puffs into the lungs  Generic drug:  albuterol

## 2019-08-21 NOTE — IP AVS SNAPSHOT
[address] [address]                                    After Visit Summary   8/21/2019    Nancy Summers    MRN: [id number]           After Visit Summary Signature Page    I have received my discharge instructions, and my questions have been answered. I have discussed any challenges I see with this plan with the nurse or doctor.    ..........................................................................................................................................  Patient/Patient Representative Signature      ..........................................................................................................................................  Patient Representative Print Name and Relationship to Patient    ..................................................               ................................................  Date                                   Time    ..........................................................................................................................................  Reviewed by Signature/Title    ...................................................              ..............................................  Date                                               Time          22EPIC Rev 08/18

## 2019-08-21 NOTE — Clinical Note
Potential access sites were evaluated for patency using ultrasound.   The right jugular vein was selected. Access was obtained under with Sonosite and Fluoroscopic guidance using a micropuncture 21 guage needle with direct visualization of needle entry.

## 2019-08-21 NOTE — DISCHARGE INSTRUCTIONS
[facility]                        Interventional Cardiology  Discharge Instructions   Post Right Heart Cath      AFTER YOU GO HOME:    DO drink plenty of fluids    DO resume your regular diet and medications unless otherwise instructed by your Primary Physician    Do Not scrub the procedure site vigorously    No lotion or powder to the puncture site for 3 days    CALL YOUR PRIMARY PHYSICIAN IF: You may resume all normal activity.  Monitor neck site for bleeding, swelling, or voice changes. If you notice bleeding or swelling immediately apply pressure to the site and call number below to speak with Cardiology Fellow.  If you experience any changes in your breathing you should call your doctor immediately or come to the closest Emergency Department.  Do not drive yourself.    ADDITIONAL INSTRUCTIONS: Medications: You are to resume all home medications including anticoagulation therapy unless otherwise advised by your primary cardiologist or nurse coordinator.    Follow Up: Per your primary cardiology team    If you have any questions or concerns regarding your procedure site please call [phone] at anytime and ask for Cardiology Fellow on call.  They are available 24 hours a day.  You may also contact the Cardiology Clinic after hours number at [phone].                                                       Telephone Numbers [phone] Monday-Friday 8:00 am to 4:30 pm    [phone] After 4:30 pm Monday-Friday, Weekends & Holidays  Ask for Interventional Cardiologist on call. Someone is on call 24 hours/day   [facility] toll free number [phone] Monday-Friday 8:00 am to 4:30 pm   [facility] Emergency Dept [phone]

## 2019-08-21 NOTE — PROGRESS NOTES
Returned from [facility] at 1350.  States she got short of breath with exercise portion of test, but is back to baseline now.  Patient tolerated recovery stage well. VSS, RIJV site clean/dry/intact, no hematoma, and denies pain. Patient tolerated PO food and fluids. Teaching was done and discharge instructions were given.  Patient discharged from the hospital via ambulatory to home with her family.

## 2019-08-22 ASSESSMENT — ENCOUNTER SYMPTOMS
DYSPNEA ON EXERTION: 1
SYNCOPE: 0
LIGHT-HEADEDNESS: 0
COUGH DISTURBING SLEEP: 0
LEG PAIN: 0
HYPERTENSION: 1
EXERCISE INTOLERANCE: 1
POSTURAL DYSPNEA: 0
NECK PAIN: 0
WHEEZING: 0
SNORES LOUDLY: 0
SPUTUM PRODUCTION: 0
ORTHOPNEA: 0
COUGH: 0
PALPITATIONS: 0
SLEEP DISTURBANCES DUE TO BREATHING: 0
JOINT SWELLING: 0
BACK PAIN: 0
SHORTNESS OF BREATH: 1
MYALGIAS: 0
ARTHRALGIAS: 1
HYPOTENSION: 0
MUSCLE WEAKNESS: 0
STIFFNESS: 1
HEMOPTYSIS: 0
MUSCLE CRAMPS: 0

## 2019-08-27 NOTE — PROGRESS NOTES
Service Date: 2019             Shelley Lennox, MD    [facility] [address] [place] [address]      RE:    Nancy Summers   MRN:  [id number]   :  1950      Dear Laurel:      Thank you very much for allowing me to see Nancy Summers, a charming 69-year-old white female who has a fairly unremarkable medical history until May when she noticed that she was markedly more short [facility] [place]  There were approximately 129 steps (personal testimony).        She has no clear-cut history of pulmonary embolism, deep venous thrombosis or risk factors for same.  She has no history of collagen vascular disease.  She has no history of liver disease or myeloproliferative syndrome.  There is no history of diet drug usage, over-the-counter medications or recreational drug abuse. She has no history of exertionally related chest pain and right and left heart catheterization with selective coronary arteriography was performed and was normal at rest.  High high-resolution CT scan showed some new small 5 mm nodules as well as apical scarring.        Her pulmonary history is significant for a prolonged upper respiratory infection while touring [place] many years ago.  Subsequent to that she experienced a chronic cough.  There is a questionable history of asthma and she has used inhalers and treatment such that her cough has dissipated with treatment.  Her CT scan did show air trapping consistent with asthma.  She has no significant history of occupational or tobacco exposure.  She has no clear-cut history of aspiration or GERD.        Interestingly, she was recently diagnosed with obstructive sleep apnea early this spring and placed on CPAP.  Coincidentally, her shortness of breath began a month later.      The very thorough workup was carried out at [facility], which was good in the sense that it disclosed no  abnormalities, however, leaves her symptoms, unexplained.  She was noted to have evidence of concentric left ventricular hypertrophy.  Given her weight, gender, age and such, this would make one suspicious for possible heart failure with preserved ejection fraction.  She has gained approximately 20 pounds over the year.  She feels that she has gained weight, around her midline.  There was no ascites noted on the CT scan.      Wt Readings from Last 24 Encounters:   19 96.2 kg (212 lb)   19 96.2 kg (212 lb)   19 96.3 kg (212 lb 6.4 oz)     Current Outpatient Medications   Medication     aspirin (ASA) 325 MG tablet     gabapentin (NEURONTIN) 100 MG capsule     montelukast (SINGULAIR) 10 MG tablet     No current facility-administered medications for this visit.          ASSESSMENT AND PLAN:       The heart catheriztion demonstrates modest at best PH, however there is a marked increase in PA mean pressure and marked symptoms.  The PCP rises dramatically with exertions, consistent with exercise associated PH associated with HFpEF.    Discussed HFpEF treatment and trial of PH medication: weight loss, exercise increase, treatment of ZAINAB, tadalafil 20 day,  Discussed with patient.        Thank you very much for allowing us to see her.      Sincerely yours,         Arminda Vann MD      cc:   Yelena Tripathi MD   [facility]   [address] [place] [address]         ARMINDA VANN MD             D: 2019   T: 2019   MT: [place]      Name:     JUSTIN BYRNES   MRN:      [id number]        Account:      [id number]   :      1950           Service Date: 2019      Document: [id number]      Answers for HPI/ROS submitted by the patient on 2019   General Symptoms: No  Skin Symptoms: No  HENT Symptoms: No  EYE SYMPTOMS: No  HEART SYMPTOMS: Yes  LUNG SYMPTOMS: Yes  INTESTINAL SYMPTOMS: No  URINARY SYMPTOMS: No  GYNECOLOGIC SYMPTOMS: No  BREAST SYMPTOMS:  No  SKELETAL SYMPTOMS: Yes  BLOOD SYMPTOMS: No  NERVOUS SYSTEM SYMPTOMS: No  MENTAL HEALTH SYMPTOMS: No  Cough: No  Sputum or phlegm: No  Coughing up blood: No  Difficulty breating or shortness of breath: Yes  Snoring: No  Wheezing: No  Difficulty breathing on exertion: Yes  Nighttime Cough: No  Difficulty breathing when lying flat: No  Chest pain or pressure: No  Fast or irregular heartbeat: No  Pain in legs with walking: No  Trouble breathing while lying down: No  Fingers or toes appear blue: No  High blood pressure: Yes  Low blood pressure: No  Fainting: No  Pacemaker: No  Varicose veins: No  Edema or swelling: Yes  Wake up at night with shortness of breath: No  Light-headedness: No  Exercise intolerance: Yes  Back pain: No  Muscle aches: No  Neck pain: No  Swollen joints: No  Joint pain: Yes  Bone pain: No  Muscle cramps: No  Muscle weakness: No  Joint stiffness: Yes  Bone fracture: No

## 2019-08-28 ENCOUNTER — OFFICE VISIT (OUTPATIENT)
Dept: CARDIOLOGY | Facility: CLINIC | Age: 69
End: 2019-08-28
Attending: INTERNAL MEDICINE
Payer: MEDICARE

## 2019-08-28 VITALS
OXYGEN SATURATION: 96 % | WEIGHT: 212 LBS | HEART RATE: 67 BPM | BODY MASS INDEX: 31.4 KG/M2 | DIASTOLIC BLOOD PRESSURE: 71 MMHG | HEIGHT: 69 IN | SYSTOLIC BLOOD PRESSURE: 139 MMHG

## 2019-08-28 DIAGNOSIS — I27.20 PULMONARY HYPERTENSION (H): Primary | ICD-10-CM

## 2019-08-28 DIAGNOSIS — R06.09 DYSPNEA ON EXERTION: ICD-10-CM

## 2019-08-28 PROCEDURE — 99214 OFFICE O/P EST MOD 30 MIN: CPT | Mod: ZP | Performed by: INTERNAL MEDICINE

## 2019-08-28 PROCEDURE — G0463 HOSPITAL OUTPT CLINIC VISIT: HCPCS | Mod: ZF

## 2019-08-28 ASSESSMENT — MIFFLIN-ST. JEOR: SCORE: 1543.07

## 2019-08-28 ASSESSMENT — PAIN SCALES - GENERAL: PAINLEVEL: NO PAIN (0)

## 2019-08-28 NOTE — NURSING NOTE
New Medication: Patient was educated regarding newly prescribed medication, including discussion of  the indication, administration, side effects, and when to report to MD or RN. Patient demonstrated understanding of this information and agreed to call with further questions or concerns.    Labs: Patient was given results of the laboratory testing obtained today. Patient demonstrated understanding of this information and agreed to call with further questions or concerns.     Diet: Patient instructed regarding a heart healthy diet, including discussion of reduced fat and sodium intake. Patient demonstrated understanding of this information and agreed to call with further questions or concerns.    Return Appointment: Patient given instructions regarding scheduling next clinic visit. Patient demonstrated understanding of this information and agreed to call with further questions or concerns.    Patient stated she understood all health information given and agreed to call with further questions or concerns.    Medication Changes:   - Start on Adcirca (tadalafil) 20mg daily for two weeks. Call us a few days before you run out of medication and let us know how you are feeling.     Patient Instructions:  1. Continue staying active and eat a heart healthy diet.    2. Please keep current list of medications with you at all times.    3. Remember to weigh yourself daily after voiding and before you consume any food or beverages and log the numbers.  If you have gained 2 pounds overnight or 5 pounds in a week contact us immediately for medication adjustments or further instructions.    4. **Please call us immediately if you have any syncope (fainting or passing out), chest pain, edema (swelling or weight gain), or decline in your functional status (general decline in how you are feeling).    Follow up Appointment Information:  - Dr. Vann would like you to exercise 6 times a week for a goal weight loss of 2lbs a week  -  Follow up with Dr. Young with labs prior in 6-8 weeks    Results:  - Right heart catheterization results reviewed

## 2019-08-28 NOTE — PATIENT INSTRUCTIONS
Medication Changes:   - Start on Adcirca (tadalafil) 20mg daily for two weeks. Call us a few days before you run out of medication and let us know how you are feeling.     Patient Instructions:  1. Continue staying active and eat a heart healthy diet.    2. Please keep current list of medications with you at all times.    3. Remember to weigh yourself daily after voiding and before you consume any food or beverages and log the numbers.  If you have gained 2 pounds overnight or 5 pounds in a week contact us immediately for medication adjustments or further instructions.    4. **Please call us immediately if you have any syncope (fainting or passing out), chest pain, edema (swelling or weight gain), or decline in your functional status (general decline in how you are feeling).    Follow up Appointment Information:  - Dr. Vann would like you to exercise 6 times a week for a goal weight loss of 2lbs a week  - Follow up with Dr. Young with labs prior in 6-8 weeks    Results:  - Right heart catheterization results reviewed  We are located on the third floor of the [facility] on the [facility].  Our address is     [address] [place] [address] on [address]rd [place] [address]      Thank you for allowing us to be a part of your care here at the [facility]    If you have questions or concerns please contact us at:    Cecilia Mills RN, BSN    Argentina Seth (Schedule,Prior Auth)  Nurse Coordinator     Clinic   Pulmonary Hypertension   Pulmonary Hypertension  [facility]  (Phone)[phone]   (Phone) [phone]        (Fax)[phone]                ** Please note that you will NOT receive a reminder call regarding your scheduled testing, reminder calls are for provider appointments only.  If you are scheduled for testing within the [facility] system you may receive  a call regarding pre-registration for billing purposes only.**     Support Group:  Pulmonary Hypertension Association  Https://www.phassociation.org/  **Look at the Events Tab** They even have Support Groups that you can call into    [facility] PH Support Group  Second Saturday of the Month from 1-3 PM   Location: [address] [place] [address]  Leader: Kitty Dozier  Phone: [phone] or [phone]  Email: [email or web address]

## 2019-08-28 NOTE — NURSING NOTE
Chief Complaint   Patient presents with     Follow Up     Rtn Prim Pulm     Vitals were taken and medications were reconciled.   Janell Enriquez  12:03 PM

## 2019-08-28 NOTE — LETTER
2019      RE: Nancy Summers  [address]  [facility] [place] [facility] [facility] [address]       Dear Colleague,    Thank you for the opportunity to participate in the care of your patient, Nancy Summers, at the [facility]. Please see a copy of my visit note below.    Service Date: 2019          Shelley Lennox, MD    [facility] [address] [place] [address]      RE:    Nancy Summers   MRN:  [id number]   :  1950      Dear Laurel:      Thank you very much for allowing me to see Nancy Summers, a charming 69-year-old white female who has a fairly unremarkable medical history until May when she noticed that she was markedly more short of breath walking [place] from [place] the [place].  There were approximately 129 steps (personal testimony).        She has no clear-cut history of pulmonary embolism, deep venous thrombosis or risk factors for same.  She has no history of collagen vascular disease.  She has no history of liver disease or myeloproliferative syndrome.  There is no history of diet drug usage, over-the-counter medications or recreational drug abuse. She has no history of exertionally related chest pain and right and left heart catheterization with selective coronary arteriography was performed and was normal at rest.  High high-resolution CT scan showed some new small 5 mm nodules as well as apical scarring.        Her pulmonary history is significant for a prolonged upper respiratory infection while touring [place] many years ago.  Subsequent to that she experienced a chronic cough.  There is a questionable history of asthma and she has used inhalers and treatment such that her cough has dissipated with treatment.  Her CT scan did show air trapping consistent with asthma.  She has no significant history of occupational or tobacco exposure.  She has no clear-cut  history of aspiration or GERD.        Interestingly, she was recently diagnosed with obstructive sleep apnea early this spring and placed on CPAP.  Coincidentally, her shortness of breath began a month later.      The very thorough workup was carried out at [facility], which was good in the sense that it disclosed no abnormalities, however, leaves her symptoms, unexplained.  She was noted to have evidence of concentric left ventricular hypertrophy.  Given her weight, gender, age and such, this would make one suspicious for possible heart failure with preserved ejection fraction.  She has gained approximately 20 pounds over the year.  She feels that she has gained weight, around her midline.  There was no ascites noted on the CT scan.      Wt Readings from Last 24 Encounters:   19 96.2 kg (212 lb)   19 96.2 kg (212 lb)   19 96.3 kg (212 lb 6.4 oz)     Current Outpatient Medications   Medication     aspirin (ASA) 325 MG tablet     gabapentin (NEURONTIN) 100 MG capsule     montelukast (SINGULAIR) 10 MG tablet     No current facility-administered medications for this visit.          ASSESSMENT AND PLAN:       The heart catheriztion demonstrates modest at best PH, however there is a marked increase in PA mean pressure and marked symptoms.  The PCP rises dramatically with exertions, consistent with exercise associated PH associated with HFpEF.    Discussed HFpEF treatment and trial of PH medication: weight loss, exercise increase, treatment of ZAINAB, tadalafil 20 day,  Discussed with patient.        Thank you very much for allowing us to see her.      Sincerely yours,         Arminda Vann MD      cc:   Yelena Tripathi MD   [facility]   [address] [place] [address]         ARMINDA VANN MD             D: 2019   T: 2019   MT: [place]      Name:     JUSTIN BYRNES   MRN:      [id number]        Account:      [id number]   :      1950           Service Date:  07/17/2019      Document: [id number]

## 2019-09-01 NOTE — TELEPHONE ENCOUNTER
Brief cardiology cross cover note    Nancy called because she just started tadalafil 20mg daily on Wednesday and since has been having terrible muscle aches. We discussed that it is a rare side effect of the medicine and if it is not tolerable she could stop the tadalafil. I am routing the message to her team so they are aware of the side effect and can follow up on her symptoms.     Yulissa Brewer MD  September 1, 2019  2:30 PM

## 2019-09-04 ENCOUNTER — TELEPHONE (OUTPATIENT)
Dept: CARDIOLOGY | Facility: CLINIC | Age: 69
End: 2019-09-04

## 2019-09-04 NOTE — TELEPHONE ENCOUNTER
I called and spoke with pt and she stated that she went to an orthopedic urgent care and they stated that she has a pinched nerve.  She wants to continue taking the Adcirca (tadalafil) 20 mg daily and is seeking medical attention for the pinched nerve.  I will follow up with her next week on how she is feeling. Cecilia Mills RN on 9/4/2019 at 8:18 AM

## 2019-09-04 NOTE — TELEPHONE ENCOUNTER
I called Dr. Tripathi regarding the pts elevated lipid panal.  I also faxed the results to them Fax: [phone].  They will follow up on any intervention. Cecilia Mills RN on 9/4/2019 at 11:30 AM

## 2019-09-11 ENCOUNTER — TELEPHONE (OUTPATIENT)
Dept: CARDIOLOGY | Facility: CLINIC | Age: 69
End: 2019-09-11

## 2019-09-11 ENCOUNTER — PATIENT OUTREACH (OUTPATIENT)
Dept: CARDIOLOGY | Facility: CLINIC | Age: 69
End: 2019-09-11

## 2019-09-11 DIAGNOSIS — I27.20 PULMONARY HYPERTENSION (H): Primary | ICD-10-CM

## 2019-09-11 NOTE — TELEPHONE ENCOUNTER
PA Initiation    Medication: Tadalafil 20 mg  Insurance Company: [employer] - Phone [phone] Fax [phone]  Start Date: 9/11/2019    *Prior authorization completed and submitted through CoverMyMeds.

## 2019-09-11 NOTE — PROGRESS NOTES
I called pt to follow up on how she is tolerating the Adcirca (tadalafil) 20 mg Daily.  She states that it is going great. She is not having any side effects and may be feeling a little better.  Cecilia Mills RN on 9/11/2019 at 10:41 AM

## 2019-09-16 NOTE — TELEPHONE ENCOUNTER
Prior Authorization Approval    Authorization Effective Date: 12/30/2018  Authorization Expiration Date: 12/31/2019  Medication: Tadalafil 20 mg - Approved  Approved Dose/Quantity: 60 tablets/30 days  Reference #: [id number]  Insurance Company: [employer] - Phone [phone] Fax [phone]    BIN: [id number]; PCN: [id number]; MEM ID#: [id number]

## 2019-09-17 RX ORDER — TADALAFIL 20 MG/1
20 TABLET ORAL DAILY
Qty: 60 TABLET | Refills: 11 | Status: SHIPPED | OUTPATIENT
Start: 2019-09-17 | End: 2019-10-23

## 2019-09-17 NOTE — TELEPHONE ENCOUNTER
Prescription sent and patient called notifying her of approval.  She will call us and let us know if she has any co-payment issues. Cecilia Mills RN on 9/17/2019 at 11:10 AM    Pt called stating her copay was $430.  She applied to the [employer] and was approved for $5300. Cecilia Mills RN on 9/18/2019 at 10:49 AM

## 2019-10-20 ASSESSMENT — ENCOUNTER SYMPTOMS
COUGH: 1
BLOOD IN STOOL: 1
BACK PAIN: 1

## 2019-10-23 ENCOUNTER — OFFICE VISIT (OUTPATIENT)
Dept: CARDIOLOGY | Facility: CLINIC | Age: 69
End: 2019-10-23
Attending: INTERNAL MEDICINE
Payer: MEDICARE

## 2019-10-23 VITALS
BODY MASS INDEX: 29.12 KG/M2 | DIASTOLIC BLOOD PRESSURE: 77 MMHG | HEIGHT: 68 IN | HEART RATE: 51 BPM | OXYGEN SATURATION: 95 % | SYSTOLIC BLOOD PRESSURE: 115 MMHG | WEIGHT: 192.1 LBS

## 2019-10-23 DIAGNOSIS — I27.20 PULMONARY HYPERTENSION (H): ICD-10-CM

## 2019-10-23 DIAGNOSIS — R06.09 DYSPNEA ON EXERTION: ICD-10-CM

## 2019-10-23 LAB
ANION GAP SERPL CALCULATED.3IONS-SCNC: 7 MMOL/L (ref 3–14)
BUN SERPL-MCNC: 27 MG/DL (ref 7–30)
CALCIUM SERPL-MCNC: 8.8 MG/DL (ref 8.5–10.1)
CHLORIDE SERPL-SCNC: 108 MMOL/L (ref 94–109)
CO2 SERPL-SCNC: 26 MMOL/L (ref 20–32)
CREAT SERPL-MCNC: 0.8 MG/DL (ref 0.52–1.04)
ERYTHROCYTE [DISTWIDTH] IN BLOOD BY AUTOMATED COUNT: 14.6 % (ref 10–15)
GFR SERPL CREATININE-BSD FRML MDRD: 75 ML/MIN/{1.73_M2}
GLUCOSE SERPL-MCNC: 89 MG/DL (ref 70–99)
HCT VFR BLD AUTO: 40.2 % (ref 35–47)
HGB BLD-MCNC: 13.1 G/DL (ref 11.7–15.7)
MCH RBC QN AUTO: 28.9 PG (ref 26.5–33)
MCHC RBC AUTO-ENTMCNC: 32.6 G/DL (ref 31.5–36.5)
MCV RBC AUTO: 89 FL (ref 78–100)
NT-PROBNP SERPL-MCNC: 141 PG/ML (ref 0–125)
PLATELET # BLD AUTO: 220 10E9/L (ref 150–450)
POTASSIUM SERPL-SCNC: 4.1 MMOL/L (ref 3.4–5.3)
RBC # BLD AUTO: 4.53 10E12/L (ref 3.8–5.2)
SODIUM SERPL-SCNC: 140 MMOL/L (ref 133–144)
WBC # BLD AUTO: 8.2 10E9/L (ref 4–11)

## 2019-10-23 PROCEDURE — 85027 COMPLETE CBC AUTOMATED: CPT | Performed by: INTERNAL MEDICINE

## 2019-10-23 PROCEDURE — 99215 OFFICE O/P EST HI 40 MIN: CPT | Mod: ZP | Performed by: INTERNAL MEDICINE

## 2019-10-23 PROCEDURE — 80048 BASIC METABOLIC PNL TOTAL CA: CPT | Performed by: INTERNAL MEDICINE

## 2019-10-23 PROCEDURE — G0463 HOSPITAL OUTPT CLINIC VISIT: HCPCS | Mod: ZF

## 2019-10-23 PROCEDURE — 36415 COLL VENOUS BLD VENIPUNCTURE: CPT | Performed by: INTERNAL MEDICINE

## 2019-10-23 PROCEDURE — 83880 ASSAY OF NATRIURETIC PEPTIDE: CPT | Performed by: INTERNAL MEDICINE

## 2019-10-23 RX ORDER — TADALAFIL 20 MG/1
40 TABLET ORAL DAILY
Qty: 60 TABLET | Refills: 11 | Status: SHIPPED | OUTPATIENT
Start: 2019-10-23

## 2019-10-23 RX ORDER — ACETAMINOPHEN 500 MG
500 TABLET ORAL DAILY
COMMUNITY
Start: 2019-08-28 | End: 2020-04-29

## 2019-10-23 ASSESSMENT — PAIN SCALES - GENERAL: PAINLEVEL: NO PAIN (0)

## 2019-10-23 ASSESSMENT — MIFFLIN-ST. JEOR: SCORE: 1444.86

## 2019-10-23 NOTE — LETTER
10/23/2019      RE: Nancy Summers  [address]  [place] [address]       Dear Colleague,    Thank you for the opportunity to participate in the care of your patient, Nancy Summers, at the [facility]. Please see a copy of my visit note below.      2019       Shelley Lennox, MD    [facility] [address] [place] [address]      RE:    Nancy Summers   MRN:  [id number]   :  1950      Dear Laurel:      Thank you very much for allowing me to see Nancy Summers, a charming 69-year-old white female who has a fairly unremarkable medical history until May when she noticed that she was markedly more short of breath walking up the stairs from downtown [place] up the [place].  There were approximately 129 steps (personal testimony).        She has no clear-cut history of pulmonary embolism, deep venous thrombosis or risk factors for same.  She has no history of collagen vascular disease.  She has no history of liver disease or myeloproliferative syndrome.  There is no history of diet drug usage, over-the-counter medications or recreational drug abuse. She has no history of exertionally related chest pain and right and left heart catheterization with selective coronary arteriography was performed and was normal at rest.  High high-resolution CT scan showed some new small 5 mm nodules as well as apical scarring.        Her pulmonary history is significant for a prolonged upper respiratory infection while touring [place] many years ago.  Subsequent to that she experienced a chronic cough.  There is a questionable history of asthma and she has used inhalers and treatment such that her cough has dissipated with treatment.  Her CT scan did show air trapping consistent with asthma.  She has no significant history of occupational or tobacco exposure.  She has no clear-cut history of aspiration or GERD.         Interestingly, she was recently diagnosed with obstructive sleep apnea early this spring and placed on CPAP.  Coincidentally, her shortness of breath began a month later.      The very thorough workup was carried out at [facility], which was good in the sense that it disclosed no abnormalities, however, leaves her symptoms, unexplained.  She was noted to have evidence of concentric left ventricular hypertrophy.  Given her weight, gender, age and such, this would make one suspicious for possible heart failure with preserved ejection fraction.  She has gained approximately 20 pounds over the year.  She feels that she has gained weight, around her midline.  There was no ascites noted on the CT scan.      Wt Readings from Last 24 Encounters:   10/23/19 87.1 kg (192 lb 1.6 oz)   08/28/19 96.2 kg (212 lb)   08/21/19 96.2 kg (212 lb)   07/17/19 96.3 kg (212 lb 6.4 oz)     Current Outpatient Medications   Medication     acetaminophen (TYLENOL) 500 MG tablet     aspirin (ASA) 325 MG tablet     gabapentin (NEURONTIN) 100 MG capsule     tadalafil, PAH, 20 MG TABS     montelukast (SINGULAIR) 10 MG tablet     No current facility-administered medications for this visit.            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 Mild elevated Pulmonary Hypertension.Normal cardiac output level.   Pressures Phase: Baseline      Time Systolic Diastolic Mean A Wave V Wave EDP Max dp/dt HR   RA Pressures  1:10 PM   4 mmHg    0 mmHg     0 mmHg      64 bpm      RV Pressures 12:52 PM       486 mmHg/sec         1:11 PM 40 mmHg    3 mmHg       5 mmHg     66 bpm      PA Pressures  1:11 PM 40 mmHg    15 mmHg    26 mmHg        60 bpm      PCW Pressures  1:12 PM   16 mmHg    16 mmHg    22 mmHg      61 bpm      Pressures Phase: Post Exercise      Time Systolic Diastolic Mean A Wave V Wave EDP Max dp/dt HR   RA Pressures  1:35 PM   15 mmHg    15 mmHg    22 mmHg      121 bpm      PA Pressures  1:35 PM 66 mmHg    38 mmHg    48 mmHg        129 bpm      PCW Pressures  1:35 PM   38 mmHg    38 mmHg    48 mmHg      121 bpm      Blood Oximetry Phase: Post Exercise      Time Hb SAT(%) PO2 Content PA Sat   PA  1:31 PM  36.1 %      36.1 %      Art  1:31 PM  85 %     16.76 mL/dL       Cardiac Output Phase: Baseline      Time TDCO TDCI Dorota C.O. Dorota C.I. Dorota HR   Cardiac Output Results 12:52 PM 6.97 L/min    3.34 L/min/m2           1:18 PM 6.97 L/min          Resistance Results Phase: Baseline      Time PVR SVR PVR-I SVR-I TPR TVR TPR-I TVR-I PVR/SVR TPR/TVR   Resistance Results (Metric) 12:52 .8 dsc-5     239.63 dsc-5/m2     298.49 dsc-5     623.05 dsc-5/m2         Resistance Results (Wood) 12:52 PM 1.44 COSTA     3 COSTA/m2     3.73 COSTA     7.79 COSTA/m2         Resistance Results Phase: Post Exercise      Time PVR SVR PVR-I SVR-I TPR TVR TPR-I TVR-I PVR/SVR TPR/TVR   Resistance Results (Metric)  1:31 PM 68.95 dsc-5     143.92 dsc-5/m2     330.95 dsc-5     690.8 dsc-5/m2         Resistance Results (Wood)  1:31 PM 0.86 COSTA     1.8 COSTA                 ASSESSMENT AND PLAN:       The heart catheriztion demonstrates modest at best PH, however there is a marked increase in PA mean pressure and marked symptoms.  The PCP rises dramatically with exertions, consistent with exercise associated PH associated with HFpEF.    Discussed HFpEF treatment and trial of PH medication: weight loss, exercise increase, treatment of ZAINAB, tadalafil 20 day,  Discussed with patient.        Thank you  very much for allowing us to see her.      Sincerely yours,         Emigdio Vann MD      cc:   Yelena Tripathi MD   [facility]   [address] [place] [address]     Shelly Lennox, M.D.  Pulmonary   [facility]        EMIGDIO VANN MD

## 2019-10-23 NOTE — PATIENT INSTRUCTIONS
Medication Changes:   - Increase your tadalafil to 40mg daily on Sunday. Call us if you have any adverse side effects.     Patient Instructions:  1. Continue staying active and eat a heart healthy diet.    2. Please keep current list of medications with you at all times.    3. Remember to weigh yourself daily after voiding and before you consume any food or beverages and log the numbers.  If you have gained 2 pounds overnight or 5 pounds in a week contact us immediately for medication adjustments or further instructions.    4. **Please call us immediately if you have any syncope (fainting or passing out), chest pain, edema (swelling or weight gain), or decline in your functional status (general decline in how you are feeling).    Follow up Appointment Information:  - Follow up with Dr. Vann in April/May with an Echo and labs prior  - Call us if you copay is too high with the increased dosing. Use GoodRx if needed    Check-In  Time Check-In Location Estimated Length Procedure   Name         60 minutes Echocardiogram (Echo)**   Procedure Preparations & Instructions     This is a non-invasive procedure and does NOT require any preparation       Results:  Component      Latest Ref Rng & Units 10/23/2019   Sodium      133 - 144 mmol/L 140   Potassium      3.4 - 5.3 mmol/L 4.1   Chloride      94 - 109 mmol/L 108   Carbon Dioxide      20 - 32 mmol/L 26   Anion Gap      3 - 14 mmol/L 7   Glucose      70 - 99 mg/dL 89   Urea Nitrogen      7 - 30 mg/dL 27   Creatinine      0.52 - 1.04 mg/dL 0.80   GFR Estimate      >60 mL/min/1.73:m2 75   GFR Estimate If Black      >60 mL/min/1.73:m2 87   Calcium      8.5 - 10.1 mg/dL 8.8   WBC      4.0 - 11.0 10e9/L 8.2   RBC Count      3.8 - 5.2 10e12/L 4.53   Hemoglobin      11.7 - 15.7 g/dL 13.1   Hematocrit      35.0 - 47.0 % 40.2   MCV      78 - 100 fl 89   MCH      26.5 - 33.0 pg 28.9   MCHC      31.5 - 36.5 g/dL 32.6   RDW      10.0 - 15.0 % 14.6   Platelet Count      150 - 450  10e9/L 220   N-Terminal Pro Bnp      0 - 125 pg/mL 141 (H)     We are located on the third floor of [facility] the [facility].  Our address is     [address] [place] [address] on [address]rd [place] [address]      Thank you for allowing us to be a part of your care here at the [facility]    If you have questions or concerns please contact us at:    Cecilia Mills, RN, BSN    Argentina Seth (Schedule,Prior Auth)  Nurse Coordinator     Clinic   Pulmonary Hypertension   Pulmonary Hypertension  [facility]  (Phone)[phone]   (Phone) [phone]        (Fax)[phone]                ** Please note that you will NOT receive a reminder call regarding your scheduled testing, reminder calls are for provider appointments only.  If you are scheduled for testing within the [facility] system you may receive a call regarding pre-registration for billing purposes only.**     Support Group:  Pulmonary Hypertension Association  Https://www.phassociation.org/  **Look at the Events Tab** They even have Support Groups that you can call into    [facility] PH Support Group  Second Saturday of the Month from 1-3 PM   Location: [address] [place] [id number]  Leader: Kitty Segura and Lashae Dozier  Phone: [phone] or [phone]  Email: [email or web address]

## 2019-10-23 NOTE — NURSING NOTE
Procedures and/or Testing: Patient given instructions regarding  Echocardiogram, . Discussed purpose, preparation, procedure and when to expect results reported back to the patient. Patient demonstrated understanding of this information and agreed to call with further questions or concerns.    Medication Change: Patient was educated regarding prescribed medication change, including discussion of the indication, administration, side effects, and when to report to MD or RN. Patient demonstrated understanding of this information and agreed to call with further questions or concerns.    Labs: Patient was given results of the laboratory testing obtained today. Patient demonstrated understanding of this information and agreed to call with further questions or concerns.     Diet: Patient instructed regarding a heart healthy diet, including discussion of reduced fat and sodium intake. Patient demonstrated understanding of this information and agreed to call with further questions or concerns.    Return Appointment: Patient given instructions regarding scheduling next clinic visit. Patient demonstrated understanding of this information and agreed to call with further questions or concerns.    Patient stated she understood all health information given and agreed to call with further questions or concerns.    Medication Changes:   - Increase your tadalafil to 40mg daily on Sunday. Call us if you have any adverse side effects.     Patient Instructions:  1. Continue staying active and eat a heart healthy diet.    2. Please keep current list of medications with you at all times.    3. Remember to weigh yourself daily after voiding and before you consume any food or beverages and log the numbers.  If you have gained 2 pounds overnight or 5 pounds in a week contact us immediately for medication adjustments or further instructions.    4. **Please call us immediately if you have any syncope (fainting or passing out), chest pain,  edema (swelling or weight gain), or decline in your functional status (general decline in how you are feeling).    Follow up Appointment Information:  - Follow up with Dr. Vann in April/May with an Echo and labs prior  - Call us if you copay is too high with the increased dosing. Use GoodRx if needed    Check-In  Time Check-In Location Estimated Length Procedure   Name         60 minutes Echocardiogram (Echo)**   Procedure Preparations & Instructions     This is a non-invasive procedure and does NOT require any preparation       Results:  Component      Latest Ref Rng & Units 10/23/2019   Sodium      133 - 144 mmol/L 140   Potassium      3.4 - 5.3 mmol/L 4.1   Chloride      94 - 109 mmol/L 108   Carbon Dioxide      20 - 32 mmol/L 26   Anion Gap      3 - 14 mmol/L 7   Glucose      70 - 99 mg/dL 89   Urea Nitrogen      7 - 30 mg/dL 27   Creatinine      0.52 - 1.04 mg/dL 0.80   GFR Estimate      >60 mL/min/1.73:m2 75   GFR Estimate If Black      >60 mL/min/1.73:m2 87   Calcium      8.5 - 10.1 mg/dL 8.8   WBC      4.0 - 11.0 10e9/L 8.2   RBC Count      3.8 - 5.2 10e12/L 4.53   Hemoglobin      11.7 - 15.7 g/dL 13.1   Hematocrit      35.0 - 47.0 % 40.2   MCV      78 - 100 fl 89   MCH      26.5 - 33.0 pg 28.9   MCHC      31.5 - 36.5 g/dL 32.6   RDW      10.0 - 15.0 % 14.6   Platelet Count      150 - 450 10e9/L 220   N-Terminal Pro Bnp      0 - 125 pg/mL 141 (H)     **Patient will call if her copay is too high with the increased dose of tadalafil. Staff message sent to Cecilia to follow up with patient. Judi Ovalle RN on 10/23/2019 at 12:59 PM

## 2019-10-23 NOTE — NURSING NOTE
Vitals completed successfully and medication reconciled.     Yelena Contreras, JULI  12:02 PM  Chief Complaint   Patient presents with     Follow Up      Return for PH F/U (Recently started on Adcirca (tadalafil))

## 2019-10-23 NOTE — PROGRESS NOTES
2019       Shelley Lennox, MD    [facility]   [address] [place] [address]      RE:    Nancy Summers   MRN:  [id number]   :  1950      Dear Laurel:      Thank you very much for allowing me to see Nancy Summers, a charming 69-year-old white female who has a fairly unremarkable medical history until May when she noticed that she was markedly more short [place] breath [place] [facility] [place]  [place] were approximately 129 steps (personal testimony).        She has no clear-cut history of pulmonary embolism, deep venous thrombosis or risk factors for same.  She has no history of collagen vascular disease.  She has no history of liver disease or myeloproliferative syndrome.  There is no history of diet drug usage, over-the-counter medications or recreational drug abuse. She has no history of exertionally related chest pain and right and left heart catheterization with selective coronary arteriography was performed and was normal at rest.  High high-resolution CT scan showed some new small 5 mm nodules as well as apical scarring.        Her pulmonary history is significant for a prolonged upper respiratory infection while touring [place] many years ago.  Subsequent to that she experienced a chronic cough.  There is a questionable history of asthma and she has used inhalers and treatment such that her cough has dissipated with treatment.  Her CT scan did show air trapping consistent with asthma.  She has no significant history of occupational or tobacco exposure.  She has no clear-cut history of aspiration or GERD.        Interestingly, she was recently diagnosed with obstructive sleep apnea early this spring and placed on CPAP.  Coincidentally, her shortness of breath began a month later.      The very thorough workup was carried out at [facility], which was good in the sense that it disclosed no abnormalities, however, leaves her  symptoms, unexplained.  She was noted to have evidence of concentric left ventricular hypertrophy.  Given her weight, gender, age and such, this would make one suspicious for possible heart failure with preserved ejection fraction.  She has gained approximately 20 pounds over the year.  She feels that she has gained weight, around her midline.  There was no ascites noted on the CT scan.      Wt Readings from Last 24 Encounters:   10/23/19 87.1 kg (192 lb 1.6 oz)   08/28/19 96.2 kg (212 lb)   08/21/19 96.2 kg (212 lb)   07/17/19 96.3 kg (212 lb 6.4 oz)     Current Outpatient Medications   Medication     acetaminophen (TYLENOL) 500 MG tablet     aspirin (ASA) 325 MG tablet     gabapentin (NEURONTIN) 100 MG capsule     tadalafil, PAH, 20 MG TABS     montelukast (SINGULAIR) 10 MG tablet     No current facility-administered medications for this visit.            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 Mild elevated Pulmonary Hypertension.Normal cardiac output level.   Pressures Phase: Baseline      Time Systolic Diastolic Mean A Wave V Wave EDP Max dp/dt HR   RA Pressures  1:10 PM   4 mmHg    0 mmHg    0 mmHg      64 bpm      RV Pressures 12:52 PM       486 mmHg/sec         1:11 PM 40 mmHg    3 mmHg       5 mmHg     66 bpm      PA Pressures  1:11 PM 40 mmHg    15 mmHg    26 mmHg        60 bpm      PCW Pressures  1:12 PM   16 mmHg    16 mmHg    22 mmHg      61 bpm      Pressures Phase: Post Exercise      Time Systolic  Diastolic Mean A Wave V Wave EDP Max dp/dt HR   RA Pressures  1:35 PM   15 mmHg    15 mmHg    22 mmHg      121 bpm      PA Pressures  1:35 PM 66 mmHg    38 mmHg    48 mmHg        129 bpm      PCW Pressures  1:35 PM   38 mmHg    38 mmHg    48 mmHg      121 bpm      Blood Oximetry Phase: Post Exercise      Time Hb SAT(%) PO2 Content PA Sat   PA  1:31 PM  36.1 %      36.1 %      Art  1:31 PM  85 %     16.76 mL/dL       Cardiac Output Phase: Baseline      Time TDCO TDCI Dorota C.O. Dorota C.I. Dorota HR   Cardiac Output Results 12:52 PM 6.97 L/min    3.34 L/min/m2           1:18 PM 6.97 L/min          Resistance Results Phase: Baseline      Time PVR SVR PVR-I SVR-I TPR TVR TPR-I TVR-I PVR/SVR TPR/TVR   Resistance Results (Metric) 12:52 .8 dsc-5     239.63 dsc-5/m2     298.49 dsc-5     623.05 dsc-5/m2         Resistance Results (Wood) 12:52 PM 1.44 COSTA     3 COSTA/m2     3.73 COSTA     7.79 COSTA/m2         Resistance Results Phase: Post Exercise      Time PVR SVR PVR-I SVR-I TPR TVR TPR-I TVR-I PVR/SVR TPR/TVR   Resistance Results (Metric)  1:31 PM 68.95 dsc-5     143.92 dsc-5/m2     330.95 dsc-5     690.8 dsc-5/m2         Resistance Results (Wood)  1:31 PM 0.86 COSTA     1.8 COSTA                 ASSESSMENT AND PLAN:       The heart catheriztion demonstrates modest at best PH, however there is a marked increase in PA mean pressure and marked symptoms.  The PCP rises dramatically with exertions, consistent with exercise associated PH associated with HFpEF.    Discussed HFpEF treatment and trial of PH medication: weight loss, exercise increase, treatment of ZAINAB, tadalafil 20 day,  Discussed with patient.        Thank you very much for allowing us to see her.      Sincerely yours,         Emigdio Vann MD      cc:   Yelena Tripathi MD   [facility]   [address] [place] [address]     Shelly Lennox, M.D.  [facility]        MD MONET PAREDES

## 2019-12-30 ENCOUNTER — MEDICAL CORRESPONDENCE (OUTPATIENT)
Dept: HEALTH INFORMATION MANAGEMENT | Facility: CLINIC | Age: 69
End: 2019-12-30

## 2020-01-02 ENCOUNTER — TELEPHONE (OUTPATIENT)
Dept: CARDIOLOGY | Facility: CLINIC | Age: 70
End: 2020-01-02

## 2020-01-02 NOTE — TELEPHONE ENCOUNTER
PA Initiation    Medication: Tadalafil 20mg  Insurance Company: [employer] - Phone [phone] Fax [phone]  Pharmacy Filling the Rx: [id number] [place] [facility] [address]  Start Date: 1/2/2020    *Prior authorization completed and submitted through CoverMyMeds along with letter of medical necessity and clinic note.

## 2020-01-03 NOTE — TELEPHONE ENCOUNTER
Prior Authorization Approval    Authorization Effective Date: 1/2/2020  Authorization Expiration Date:    Medication: Tadalafil 20mg - Approved  Approved Dose/Quantity: 60 tablets/30 days  Reference #: [id number]   Insurance Company: [employer] - Phone [phone] Fax [phone]  Which Pharmacy is filling the prescription (Not needed for infusion/clinic administered): [facility] [id number] [place], [place] - [address]  Patient Notified:  Yes  ---------------  Insurance: [employer]  BIN: [id number]  PCN: [id number]  ID#: [id number]  GRP#: [id number]

## 2020-03-11 ENCOUNTER — HEALTH MAINTENANCE LETTER (OUTPATIENT)
Age: 70
End: 2020-03-11

## 2020-03-11 ENCOUNTER — DOCUMENTATION ONLY (OUTPATIENT)
Dept: CARE COORDINATION | Facility: CLINIC | Age: 70
End: 2020-03-11

## 2020-04-23 ASSESSMENT — ENCOUNTER SYMPTOMS
SHORTNESS OF BREATH: 1
EYE WATERING: 1
INSOMNIA: 1
DYSPNEA ON EXERTION: 1

## 2020-04-25 NOTE — PROGRESS NOTES
Shelley Lennox, MD    [facility]   [address] [place] [address]     Yelena Tripathi M.D.  [place], [place]     RE:    Nancy Summers   MRN:  [id number]   :  1950     Appointment Duration:     Impression:  1. Exertional shortness of breath  2. Exercise induced pulmonary hypertension  3. HFpEF  4. ZAINAB     Dear [place]:      Thank you very much for allowing me to see Nancy Summers, a [facility] 70-year-old white female who has a fairly unremarkable medical history until May when she noticed that she was markedly more short of breath walking [place].  There were approximately 129 steps (personal testimony). I had the opportunity to meet with her via video visit from her home while I was in my home office.   The visit last from 4:40 PM to 530 PM  Video visit with patient permission.    We reviewed her previous cardiac catherization results that demonstrated normal coronary arteries and exercise associated pulmonary venous hypertension.  She was started on tadalafil with marked improvement for 6 months, however has felt worse with increasing shortness of breath since March, despite participating in pulmonary rehabilitation.    Previus history of inhaler as well as CT suggesting small airway disease as well as new pulmonary nodules.      She does walk 1-2 miles per day, has lost 25 pounds and reports blood pressure under control         I reviewed her current medication profile in the context of her previous findings and current condition:  Current Outpatient Medications   Medication     Acetaminophen (TYLENOL EXTRA STRENGTH PO)     gabapentin (NEURONTIN) 100 MG capsule     tadalafil, PAH, 20 MG TABS     No current facility-administered medications for this visit.      I reviewed her previous heart catheirzation with exercise that demonstrated marked increase in PCP without an increase in the PVR, most consistent with  exercise associated HFpEF    I reviewed her previous laboratory studies that are abnormal in red     Results for BULMARO BYRNES (MRN [id number]) as of 4/29/2020 16:33   Ref. Range 7/17/2019 09:31 8/21/2019 10:53 8/21/2019 13:51 10/23/2019 11:36   Sodium Latest Ref Range: 133 - 144 mmol/L 139   140   Potassium Latest Ref Range: 3.4 - 5.3 mmol/L 4.0   4.1   Chloride Latest Ref Range: 94 - 109 mmol/L 107   108   Carbon Dioxide Latest Ref Range: 20 - 32 mmol/L 27   26   Urea Nitrogen Latest Ref Range: 7 - 30 mg/dL 33 (H)   27   Creatinine Latest Ref Range: 0.52 - 1.04 mg/dL 0.76   0.80   GFR Estimate Latest Ref Range: >60 mL/min/1.73_m2 79   75   GFR Estimate If Black Latest Ref Range: >60 mL/min/1.73_m2 >90   87   Calcium Latest Ref Range: 8.5 - 10.1 mg/dL 8.8   8.8   Anion Gap Latest Ref Range: 3 - 14 mmol/L 5   7   Albumin Latest Ref Range: 3.4 - 5.0 g/dL 3.9      Protein Total Latest Ref Range: 6.8 - 8.8 g/dL 7.3      Bilirubin Total Latest Ref Range: 0.2 - 1.3 mg/dL 0.5      Alkaline Phosphatase Latest Ref Range: 40 - 150 U/L 86      ALT Latest Ref Range: 0 - 50 U/L 28      AST Latest Ref Range: 0 - 45 U/L 18      Bilirubin Direct Latest Ref Range: 0.0 - 0.2 mg/dL <0.1      Cholesterol Latest Ref Range: <200 mg/dL 183 181     CRP Inflammation Latest Ref Range: 0.0 - 8.0 mg/L 12.1 (H) 15.0 (H)     HDL Cholesterol Latest Ref Range: >49 mg/dL 47 (L) 43 (L)     Interleukin 6 Blood Latest Ref Range: <3.01 pg/mL 1.92      Iron Latest Ref Range: 35 - 180 ug/dL 64      Iron Binding Cap Latest Ref Range: 240 - 430 ug/dL 288      Iron Saturation Index Latest Ref Range: 15 - 46 % 22      LDL Cholesterol Calculated Latest Ref Range: <100 mg/dL 81 91     Non HDL Cholesterol Latest Ref Range: <130 mg/dL 136 (H) 138 (H)     N-Terminal Pro Bnp Latest Ref Range: 0 - 125 pg/mL 102   141 (H)   Rheumatoid Factor Latest Ref Range: <20 IU/mL <20      Soluble Transferrin Receptor Latest Ref Range: 1.9 - 4.4 mg/L 2.6      Triglycerides  Latest Ref Range: <150 mg/dL 277 (H) 236 (H)     TSH Latest Ref Range: 0.40 - 4.00 mU/L 1.98      Glucose Latest Ref Range: 70 - 99 mg/dL 82   89     I reviewed her previous CT Chest with abnormalities underlined in red    EXAM: CT CHEST WO IV CONT HI-RES  LOCATION: [facility]  DATE/TIME: 7/8/2019 1:32 PM    INDICATION: Unexplained shortness of breath. exclude ILD  COMPARISON: 6/17/2019   TECHNIQUE: High resolution images were obtained through the chest during inspiration with select expiratory views. Prone imaging was performed. Multiplanar reformats were obtained. Dose reduction techniques were used.  IV CONTRAST: None.    FINDINGS:   LUNGS AND PLEURA: No tracheobronchomalacia. There is air trapping with expiratory imaging. There is no bronchial wall thickening or bronchiectasis. There is a new 5 mm x 5 mm left upper lobe nodule (series 6 image 51). Apical scarring is present. There is a 5 mm left lower lobe nodule which is new (image 103). A 2 mm right lower lobe nodule is newly identified (image 118). There is a newly identified 2 mm right lower lobe nodule (image 125).  No significant subpleural reticular interstitial opacities. There are small linear opacities in the lingula and right middle lobe.    MEDIASTINUM: Normal size of the heart. The main pulmonary artery is 3.6 cm. No thoracic lymphadenopathy.     LIMITED UPPER ABDOMEN: Negative.    MUSCULOSKELETAL: Negative.    CONCLUSION:   1.  There are no subpleural reticular interstitial opacities to suggest diffuse fibrosis. There is mild atelectasis or scarring in the right middle lobe and lingula.  2.  There are new pulmonary nodules which measure up to 5 mm. Recommend follow-up chest CT in 6 months.  3.  Air trapping suggests small airways disease.  4.  The main pulmonary artery is enlarged, which can be seen with pulmonary hypertension  ROS:    General Symptoms: No  Skin Symptoms: Yes  HENT Symptoms: No  EYE SYMPTOMS: Yes  HEART SYMPTOMS: No  LUNG  SYMPTOMS: Yes  INTESTINAL SYMPTOMS: No  URINARY SYMPTOMS: No  GYNECOLOGIC SYMPTOMS: No  BREAST SYMPTOMS: No  SKELETAL SYMPTOMS: No  BLOOD SYMPTOMS: No  NERVOUS SYSTEM SYMPTOMS: No  MENTAL HEALTH SYMPTOMS: Yes  Changes in hair: Yes  Itching: Yes  Dry eyes: Yes  Watery eyes: Yes  Difficulty breating or shortness of breath: Yes  Difficulty breathing on exertion: Yes  Trouble sleeping: Yes          Wt Readings from Last 24 Encounters:   10/23/19 87.1 kg (192 lb 1.6 oz)   08/28/19 96.2 kg (212 lb)   08/21/19 96.2 kg (212 lb)   07/17/19 96.3 kg (212 lb 6.4 oz)     Medications:       Right sided filling pressures are normal at rest    Mild Pulmonary Hypertension at rest    Left sided filling pressures are mildly elevated at rest    Normal cardiac output level at rest    Signficant elevation in PCWP with exercise with no signficant increase in PVR suggestive of exercise induced HFpEF    Exercise induced Pulmonary Hypertension due to HFpEF          Hemodynamics     Right Heart Pressures     Baseline Dorota CO/CI:    Post Exercise Dorota CO/CI: Right sided filling pressures are normal.Left sided filling pressures are mildly elevated. Mild elevated Pulmonary Hypertension.Normal cardiac output level.   Pressures Phase: Baseline      Time Systolic Diastolic Mean A Wave V Wave EDP Max dp/dt HR   RA Pressures  1:10 PM   4 mmHg    0 mmHg    0 mmHg      64 bpm      RV Pressures 12:52 PM       486 mmHg/sec         1:11 PM 40 mmHg    3 mmHg       5 mmHg     66 bpm      PA Pressures  1:11 PM 40 mmHg    15 mmHg    26 mmHg        60 bpm      PCW Pressures  1:12 PM   16 mmHg    16 mmHg    22 mmHg      61 bpm      Pressures Phase: Post Exercise      Time Systolic Diastolic Mean A Wave V Wave EDP Max dp/dt HR   RA Pressures  1:35 PM   15 mmHg    15 mmHg    22 mmHg      121 bpm      PA Pressures  1:35 PM 66 mmHg    38 mmHg    48 mmHg        129 bpm      PCW Pressures  1:35 PM   38 mmHg    38 mmHg    48 mmHg      121 bpm      Blood Oximetry  Phase: Post Exercise      Time Hb SAT(%) PO2 Content PA Sat   PA  1:31 PM  36.1 %      36.1 %      Art  1:31 PM  85 %     16.76 mL/dL       Cardiac Output Phase: Baseline      Time TDCO TDCI Dorota C.O. Dorota C.I. Dorota HR   Cardiac Output Results 12:52 PM 6.97 L/min    3.34 L/min/m2           1:18 PM 6.97 L/min          Resistance Results Phase: Baseline      Time PVR SVR PVR-I SVR-I TPR TVR TPR-I TVR-I PVR/SVR TPR/TVR   Resistance Results (Metric) 12:52 .8 dsc-5     239.63 dsc-5/m2     298.49 dsc-5     623.05 dsc-5/m2         Resistance Results (Wood) 12:52 PM 1.44 COSTA     3 COSTA/m2     3.73 COSTA     7.79 COSTA/m2         Resistance Results Phase: Post Exercise      Time PVR SVR PVR-I SVR-I TPR TVR TPR-I TVR-I PVR/SVR TPR/TVR   Resistance Results (Metric)  1:31 PM 68.95 dsc-5     143.92 dsc-5/m2     330.95 dsc-5     690.8 dsc-5/m2         Resistance Results (Wood)  1:31 PM 0.86 COSTA     1.8 COSTA                 Physical examination limited by video visit:  Alert, oriented, normal mentation, gait and station, normal JVP, no edema, no skin lesions, mucous membranes moist    In view of deterioration in function capacity despite initial improvement would think in the context of previous studies and findings that she should have:    1. Cardiac MRI to exclude infiltrative disease with ALLEGRA  2. I personally reviewed images of pulmonary nodules and should have f/u CT chest without enhancement  3. Cardiopulmonary exercise test when available  4. Repeat exercise right heart catherization in view of evolving symptoms and need for potential new medications    We discussed that necessary testing unlikley to be available until July.        Thank you very much for allowing us to see her.      Sincerely yours,         Emigdio Vann MD      cc:   Yelena Tripathi MD   [facility]   [address] [place] [address]     Shelly Lennox, M.D.  [facility]        MD MONET PAREDES

## 2020-04-29 ENCOUNTER — VIRTUAL VISIT (OUTPATIENT)
Dept: CARDIOLOGY | Facility: CLINIC | Age: 70
End: 2020-04-29
Attending: INTERNAL MEDICINE
Payer: MEDICARE

## 2020-04-29 DIAGNOSIS — I27.20 PULMONARY HYPERTENSION (H): ICD-10-CM

## 2020-04-29 DIAGNOSIS — R06.09 DYSPNEA ON EXERTION: ICD-10-CM

## 2020-04-29 PROCEDURE — 99215 OFFICE O/P EST HI 40 MIN: CPT | Mod: 95 | Performed by: INTERNAL MEDICINE

## 2020-04-29 ASSESSMENT — PAIN SCALES - GENERAL: PAINLEVEL: NO PAIN (0)

## 2020-04-29 NOTE — PROGRESS NOTES
"Nancy Summers is a 70 year old female who is being evaluated via a billable telephone visit.      The patient has been notified of following:     \"This telephone visit will be conducted via a call between you and your physician/provider. We have found that certain health care needs can be provided without the need for a physical exam.  This service lets us provide the care you need with a short phone conversation.  If a prescription is necessary we can send it directly to your pharmacy.  If lab work is needed we can place an order for that and you can then stop by our lab to have the test done at a later time.    Telephone visits are billed at different rates depending on your insurance coverage. During this emergency period, for some insurers they may be billed the same as an in-person visit.  Please reach out to your insurance provider with any questions.    If during the course of the call the physician/provider feels a telephone visit is not appropriate, you will not be charged for this service.\"    Patient has given verbal consent for Telephone visit?  Yes    How would you like to obtain your AVS? MyChart      "

## 2020-05-06 PROBLEM — I27.20 PULMONARY HYPERTENSION (H): Status: ACTIVE | Noted: 2019-07-17

## 2020-05-06 RX ORDER — LIDOCAINE 40 MG/G
CREAM TOPICAL
Status: CANCELLED | OUTPATIENT
Start: 2020-05-06

## 2020-05-06 NOTE — NURSING NOTE
Orders placed for the pat and reminder sent to call pt and follow up on Pre-Procedure instructions.     1. Cardiac MRI to exclude infiltrative disease with ALLEGRA  2. I personally reviewed images of pulmonary nodules and should have f/u CT chest without enhancement  3. Cardiopulmonary exercise test when available  4. Repeat exercise right heart catherization in view of evolving symptoms and need for potential new medications    I called the pt and let her know that she will receive a call to schedule all of the testing around June.  I also sent a message to [place].  Cecilia Mills RN on 5/6/2020 at 12:02 PM

## 2020-05-20 ENCOUNTER — VIRTUAL VISIT (OUTPATIENT)
Dept: CARDIOLOGY | Facility: CLINIC | Age: 70
End: 2020-05-20
Attending: INTERNAL MEDICINE
Payer: MEDICARE

## 2020-05-20 ENCOUNTER — PATIENT OUTREACH (OUTPATIENT)
Dept: CARDIOLOGY | Facility: CLINIC | Age: 70
End: 2020-05-20

## 2020-05-20 VITALS
DIASTOLIC BLOOD PRESSURE: 77 MMHG | SYSTOLIC BLOOD PRESSURE: 119 MMHG | BODY MASS INDEX: 28.62 KG/M2 | HEART RATE: 73 BPM | WEIGHT: 188.2 LBS

## 2020-05-20 DIAGNOSIS — I27.20 PULMONARY HYPERTENSION (H): Primary | ICD-10-CM

## 2020-05-20 PROCEDURE — 99442: CPT | Performed by: INTERNAL MEDICINE

## 2020-05-20 RX ORDER — FUROSEMIDE 20 MG
20 TABLET ORAL DAILY
Qty: 30 TABLET | Refills: 0 | Status: SHIPPED | OUTPATIENT
Start: 2020-05-20 | End: 2020-06-16

## 2020-05-20 ASSESSMENT — PAIN SCALES - GENERAL: PAINLEVEL: NO PAIN (0)

## 2020-05-20 NOTE — PROGRESS NOTES
"Nancy Summers is a 70 year old female who is being evaluated via a billable telephone visit.      The patient has been notified of following:     \"This telephone visit will be conducted via a call between you and your physician/provider. We have found that certain health care needs can be provided without the need for a physical exam.  This service lets us provide the care you need with a short phone conversation.  If a prescription is necessary we can send it directly to your pharmacy.  If lab work is needed we can place an order for that and you can then stop by our lab to have the test done at a later time.    Telephone visits are billed at different rates depending on your insurance coverage. During this emergency period, for some insurers they may be billed the same as an in-person visit.  Please reach out to your insurance provider with any questions.    If during the course of the call the physician/provider feels a telephone visit is not appropriate, you will not be charged for this service.\"    Patient has given verbal consent for Telephone visit?  Yes    What phone number would you like to be contacted at? [phone]    How would you like to obtain your AVS? MyChart Shelley Lennox, MD    [facility] [address] [place] [address]      Yelena Tripathi M.D.  [place], [place]     RE:      Nancy Summers   MRN:   [id number]   :   1950      Appointment Duration:      Impression:  1. Exertional shortness of breath  2. Exercise induced pulmonary hypertension  3. HFpEF  4. ZAINAB    I had a telephone visit with the patient's permission.  The duration was 11 minutes.  She reports increasing shortness of breath without wheezing, PND, orthopnea but does note 4 pound weight gain and dependent ankle edema.  She is following her diet.  She has no interim history of typical chest pain (normal coronaries), no wheezing, increased abdominal girth. She is " walking 3-4 miles per day.  She is following her diet (previously noted 25 pound weight loss)    Will give her furosemide 20mgs Thursday and Friday and have BMP checked with [facility] on Monday or Tuesday.  Heart cath in near future scheduled      Current Outpatient Medications   Medication     Acetaminophen (TYLENOL EXTRA STRENGTH PO)     gabapentin (NEURONTIN) 100 MG capsule     tadalafil, PAH, 20 MG TABS     No current facility-administered medications for this visit.

## 2020-05-22 NOTE — NURSING NOTE
Per. Dr. Vann, we will give her furosemide 20mgs Thursday and Friday and have BMP checked with [facility] on Monday or Tuesday.  Heart cath in near future scheduled.  Orders placed and faxed and we will follow up with her on Monday.  Cecilia Mills RN on 5/22/2020 at 10:24 AM

## 2020-05-29 DIAGNOSIS — Z11.59 ENCOUNTER FOR SCREENING FOR OTHER VIRAL DISEASES: Primary | ICD-10-CM

## 2020-06-11 DIAGNOSIS — I27.20 PULMONARY HYPERTENSION (H): ICD-10-CM

## 2020-06-15 NOTE — TELEPHONE ENCOUNTER
furosemide (LASIX) 20 MG    Last Written Prescription Date:  5/20/20  Last Fill Quantity: 30,   # refills: 0  Last Office Visit : 5/20/20  Future Office visit: NONE    Routing refill request to provider for review/approval because:  MED RF UNDETERMINED  / SEE LAST NOTE  AND RF NOTE * Take when oked by provider

## 2020-06-16 RX ORDER — FUROSEMIDE 20 MG
20 TABLET ORAL DAILY PRN
Qty: 30 TABLET | Refills: 3 | Status: SHIPPED | OUTPATIENT
Start: 2020-06-16

## 2020-06-26 ENCOUNTER — TELEPHONE (OUTPATIENT)
Dept: CARDIOLOGY | Facility: CLINIC | Age: 70
End: 2020-06-26

## 2020-06-26 NOTE — TELEPHONE ENCOUNTER
[facility] Call Center    Phone Message    May a detailed message be left on voicemail: yes     Reason for Call: Other: [place] calling because she has not heard from the care team about scheduling her COVID-19 testing prior to her right heart cath with Dr. Sung on 7/1/20. [place] is hoping to have the test done in [place], [place] if possible. Please give [place] a call back at your earliest convenience.     Action Taken: Message routed to:  [facility]  Cardio    Travel Screening: Not Applicable           -----------------------------   Called patient and she needs COVID order faxed to [facility] for Monday testing.     she was given the following fax #[phone] for results to be sent to the [facility].      Faxed COVID order. Amirah Busby RN on 6/26/2020 at 4:00 PM

## 2020-06-30 ENCOUNTER — TELEPHONE (OUTPATIENT)
Dept: CARDIOLOGY | Facility: CLINIC | Age: 70
End: 2020-06-30

## 2020-06-30 NOTE — TELEPHONE ENCOUNTER
Call complete for pre procedure reminder, travel screen and updated visitor policy.  COVID Negative

## 2020-07-01 ENCOUNTER — APPOINTMENT (OUTPATIENT)
Dept: LAB | Facility: CLINIC | Age: 70
End: 2020-07-01
Attending: INTERNAL MEDICINE
Payer: MEDICARE

## 2020-07-01 ENCOUNTER — APPOINTMENT (OUTPATIENT)
Dept: MEDSURG UNIT | Facility: CLINIC | Age: 70
End: 2020-07-01
Attending: INTERNAL MEDICINE
Payer: MEDICARE

## 2020-07-01 ENCOUNTER — HOSPITAL ENCOUNTER (OUTPATIENT)
Facility: CLINIC | Age: 70
Discharge: HOME | End: 2020-07-01
Attending: INTERNAL MEDICINE | Admitting: INTERNAL MEDICINE
Payer: MEDICARE

## 2020-07-01 VITALS
RESPIRATION RATE: 18 BRPM | TEMPERATURE: 98.1 F | BODY MASS INDEX: 27.4 KG/M2 | DIASTOLIC BLOOD PRESSURE: 70 MMHG | WEIGHT: 185 LBS | SYSTOLIC BLOOD PRESSURE: 154 MMHG | HEIGHT: 69 IN | OXYGEN SATURATION: 98 %

## 2020-07-01 DIAGNOSIS — I27.20 PULMONARY HYPERTENSION (H): ICD-10-CM

## 2020-07-01 LAB
ALBUMIN SERPL-MCNC: 4 G/DL (ref 3.4–5)
ALP SERPL-CCNC: 70 U/L (ref 40–150)
ALT SERPL W P-5'-P-CCNC: 34 U/L (ref 0–50)
ANION GAP SERPL CALCULATED.3IONS-SCNC: 4 MMOL/L (ref 3–14)
AST SERPL W P-5'-P-CCNC: 26 U/L (ref 0–45)
BASOPHILS # BLD AUTO: 0 10E9/L (ref 0–0.2)
BASOPHILS NFR BLD AUTO: 0.5 %
BILIRUB SERPL-MCNC: 0.6 MG/DL (ref 0.2–1.3)
BUN SERPL-MCNC: 29 MG/DL (ref 7–30)
CALCIUM SERPL-MCNC: 9 MG/DL (ref 8.5–10.1)
CHLORIDE SERPL-SCNC: 110 MMOL/L (ref 94–109)
CO2 SERPL-SCNC: 27 MMOL/L (ref 20–32)
CREAT SERPL-MCNC: 0.94 MG/DL (ref 0.52–1.04)
DIFFERENTIAL METHOD BLD: NORMAL
EOSINOPHIL # BLD AUTO: 0.1 10E9/L (ref 0–0.7)
EOSINOPHIL NFR BLD AUTO: 1.4 %
ERYTHROCYTE [DISTWIDTH] IN BLOOD BY AUTOMATED COUNT: 14.6 % (ref 10–15)
GFR SERPL CREATININE-BSD FRML MDRD: 61 ML/MIN/{1.73_M2}
GLUCOSE SERPL-MCNC: 95 MG/DL (ref 70–99)
HCT VFR BLD AUTO: 42 % (ref 35–47)
HGB BLD-MCNC: 13.3 G/DL (ref 11.7–15.7)
IMM GRANULOCYTES # BLD: 0 10E9/L (ref 0–0.4)
IMM GRANULOCYTES NFR BLD: 0.5 %
LYMPHOCYTES # BLD AUTO: 1.3 10E9/L (ref 0.8–5.3)
LYMPHOCYTES NFR BLD AUTO: 23.3 %
MCH RBC QN AUTO: 28.6 PG (ref 26.5–33)
MCHC RBC AUTO-ENTMCNC: 31.7 G/DL (ref 31.5–36.5)
MCV RBC AUTO: 90 FL (ref 78–100)
MONOCYTES # BLD AUTO: 0.4 10E9/L (ref 0–1.3)
MONOCYTES NFR BLD AUTO: 7.7 %
NEUTROPHILS # BLD AUTO: 3.7 10E9/L (ref 1.6–8.3)
NEUTROPHILS NFR BLD AUTO: 66.6 %
NRBC # BLD AUTO: 0 10*3/UL
NRBC BLD AUTO-RTO: 0 /100
PLATELET # BLD AUTO: 213 10E9/L (ref 150–450)
POTASSIUM SERPL-SCNC: 4.6 MMOL/L (ref 3.4–5.3)
PROT SERPL-MCNC: 7.2 G/DL (ref 6.8–8.8)
RBC # BLD AUTO: 4.65 10E12/L (ref 3.8–5.2)
SODIUM SERPL-SCNC: 140 MMOL/L (ref 133–144)
WBC # BLD AUTO: 5.6 10E9/L (ref 4–11)

## 2020-07-01 PROCEDURE — 80053 COMPREHEN METABOLIC PANEL: CPT | Performed by: INTERNAL MEDICINE

## 2020-07-01 PROCEDURE — 27210794 ZZH OR GENERAL SUPPLY STERILE: Performed by: INTERNAL MEDICINE

## 2020-07-01 PROCEDURE — 93464 EXERCISE W/HEMODYNAMIC MEAS: CPT | Performed by: INTERNAL MEDICINE

## 2020-07-01 PROCEDURE — 93464 EXERCISE W/HEMODYNAMIC MEAS: CPT | Mod: 26 | Performed by: INTERNAL MEDICINE

## 2020-07-01 PROCEDURE — 25000132 ZZH RX MED GY IP 250 OP 250 PS 637: Mod: GY | Performed by: NURSE PRACTITIONER

## 2020-07-01 PROCEDURE — 25000125 ZZHC RX 250: Performed by: INTERNAL MEDICINE

## 2020-07-01 PROCEDURE — C1894 INTRO/SHEATH, NON-LASER: HCPCS | Performed by: INTERNAL MEDICINE

## 2020-07-01 PROCEDURE — 40000166 ZZH STATISTIC PP CARE STAGE 1

## 2020-07-01 PROCEDURE — 93451 RIGHT HEART CATH: CPT | Performed by: INTERNAL MEDICINE

## 2020-07-01 PROCEDURE — 36415 COLL VENOUS BLD VENIPUNCTURE: CPT | Performed by: INTERNAL MEDICINE

## 2020-07-01 PROCEDURE — 27210788 ZZH MANIFOLD CR3: Performed by: INTERNAL MEDICINE

## 2020-07-01 PROCEDURE — 85025 COMPLETE CBC W/AUTO DIFF WBC: CPT | Mod: GZ | Performed by: INTERNAL MEDICINE

## 2020-07-01 PROCEDURE — 93451 RIGHT HEART CATH: CPT | Mod: 26 | Performed by: INTERNAL MEDICINE

## 2020-07-01 RX ORDER — LIDOCAINE 40 MG/G
CREAM TOPICAL
Status: COMPLETED | OUTPATIENT
Start: 2020-07-01 | End: 2020-07-01

## 2020-07-01 RX ORDER — LORAZEPAM 0.5 MG/1
0.5 TABLET ORAL ONCE
Status: COMPLETED | OUTPATIENT
Start: 2020-07-01 | End: 2020-07-01

## 2020-07-01 RX ADMIN — LORAZEPAM 0.5 MG: 0.5 TABLET ORAL at 09:46

## 2020-07-01 RX ADMIN — LIDOCAINE: 40 CREAM TOPICAL at 09:41

## 2020-07-01 ASSESSMENT — MIFFLIN-ST. JEOR: SCORE: 1415.59

## 2020-07-01 NOTE — PROGRESS NOTES
Patient arrived to floor with RN from CCL s/p [facility]. VSS. RIJ CDI no hematoma. Tolerating regular diet. Patient denies pain. Discharge instructions reviewed with patient.

## 2020-07-01 NOTE — PROCEDURES
Patient underwent exercise RHC for evaluation of exertional shortness of breath.    Baseline:  RA  5  RV  30/7  PA  28/12 (20)  PCWP 15  PA%     73.8%  TDCO/CI:  7.1 l/min and 3.6 l/min/m2  Measured Dorota CO/CI: 6.2 l/min and 3.1  l/min/m2  PVR 0.8 COSTA    Exercise - 25 kerr for 4 minutes 20 seconds  HR:   BP:   RA:  10   PA:  48/28 (39)  PCWP 29 with V-wave to 37  PA%:  47.7%  Measured Dorota 12.3 CO/CI: l/min and  6.1 l/min/m2  PVR: 0.8 COSTA    Conclusion:    1. Normal PA Pressure at rest with normal biventricular filling pressures and CO  2. Exercise induced increase in PCWP suggestive of HFpEF  3. Mild exercise induced PH due to HFpEF  4. Improvement in resting and exercise pulmonary vascular hemodyanmics on tadalafil therapy    Sincerely,  Ana Lilia Sung MD   [facility]  Heart Failure, Transplant, and Mechanical Circulatory Support Cardiology   Cardiovascular Division  [facility]   [phone]

## 2020-07-01 NOTE — IP AVS SNAPSHOT
[address] [address]                                    After Visit Summary   7/1/2020    Nancy Summers    MRN: [id number]           After Visit Summary Signature Page    I have received my discharge instructions, and my questions have been answered. I have discussed any challenges I see with this plan with the nurse or doctor.    ..........................................................................................................................................  Patient/Patient Representative Signature      ..........................................................................................................................................  Patient Representative Print Name and Relationship to Patient    ..................................................               ................................................  Date                                   Time    ..........................................................................................................................................  Reviewed by Signature/Title    ...................................................              ..............................................  Date                                               Time          22EPIC Rev 08/18

## 2020-07-01 NOTE — PROGRESS NOTES
Patient tolerated recovery stage well. VSS, RIJV site clean/dry/intact, no hematoma, and denies pain. Patient tolerated PO food and fluids. Teaching was done and discharge instructions were given.   Patient discharged from the hospital via ambulatory to home with family.

## 2020-07-01 NOTE — PROGRESS NOTES
[facility]   Interventional Cardiology        Consenting/Education for Cardiac Cath Lab Procedure: Right Heart Catheterization     Patient understands we would like to perform .Right Heart Catheterization. This procedure will be performed by Dr. Sung.    The patient understands the following:     Right Heart Catheterization: A fine tube (catheter) is put into the vein of the groin/neck.  It is carefully passed along until it reaches the heart and then goes up into the blood vessels of the lungs. This is done to measure a variety of pressures in your heart and can tell us how well the heart is filling and emptying, as well as monitor fluid status.     No sedation is planned for this procedure. Patient also understands risks and complications of the procedure which include, but are not limited to bruising/swelling around the incision site, infection, bleeding, allergic reaction to local anesthetic, air embolism, arterial puncture, stroke, heart attack.       Patient verbalized understanding of risks and benefits and has elected to proceed with the procedure or procedures listed above.    STEFFANIE Austin, CNP  [facility] Cardiology

## 2020-07-01 NOTE — PROGRESS NOTES
Prepped for RH with exercise procedure.  Denies pain.  States claustrophobia was a difficult sensation for her during her last test, Ativan given per order.   waiting off site.  Consent signed.  H & P from 05/20/2020 per Dr. Vann.  Labs complete.

## 2020-07-01 NOTE — DISCHARGE INSTRUCTIONS
[facility]                        Interventional Cardiology  Discharge Instructions   Post Right Heart Cath      AFTER YOU GO HOME:    DO drink plenty of fluids    DO resume your regular diet and medications unless otherwise instructed by your Primary Physician    Do Not scrub the procedure site vigorously    No lotion or powder to the puncture site for 3 days    CALL YOUR PRIMARY PHYSICIAN IF: You may resume all normal activity.  Monitor neck site for bleeding, swelling, or voice changes. If you notice bleeding or swelling immediately apply pressure to the site and call number below to speak with Cardiology Fellow.  If you experience any changes in your breathing you should call your doctor immediately or come to the closest Emergency Department.  Do not drive yourself.    ADDITIONAL INSTRUCTIONS: Medications: You are to resume all home medications including anticoagulation therapy unless otherwise advised by your primary cardiologist or nurse coordinator.    Follow Up: Per your primary cardiology team    If you have any questions or concerns regarding your procedure site please call [phone] at anytime and ask for Cardiology Fellow on call.  They are available 24 hours a day.  You may also contact the Cardiology Clinic after hours number at [phone].                                                       Telephone Numbers [phone] Monday-Friday 8:00 am to 4:30 pm    [phone] After 4:30 pm Monday-Friday, Weekends & Holidays  Ask for Interventional Cardiologist on call. Someone is on call 24 hours/day   [facility] toll free number [phone] Monday-Friday 8:00 am to 4:30 pm   [facility] Emergency Dept [phone]

## 2020-07-09 ENCOUNTER — HOSPITAL ENCOUNTER (OUTPATIENT)
Dept: CARDIOLOGY | Facility: CLINIC | Age: 70
End: 2020-07-09
Attending: INTERNAL MEDICINE
Payer: MEDICARE

## 2020-07-09 ENCOUNTER — HOSPITAL ENCOUNTER (OUTPATIENT)
Dept: MRI IMAGING | Facility: CLINIC | Age: 70
End: 2020-07-09
Attending: INTERNAL MEDICINE
Payer: MEDICARE

## 2020-07-09 DIAGNOSIS — I27.20 PULMONARY HYPERTENSION (H): ICD-10-CM

## 2020-07-09 PROCEDURE — 94621 CARDIOPULM EXERCISE TESTING: CPT | Mod: 26 | Performed by: INTERNAL MEDICINE

## 2020-07-09 PROCEDURE — 75561 CARDIAC MRI FOR MORPH W/DYE: CPT | Mod: 26 | Performed by: INTERNAL MEDICINE

## 2020-07-09 PROCEDURE — 75561 CARDIAC MRI FOR MORPH W/DYE: CPT

## 2020-07-09 PROCEDURE — A9585 GADOBUTROL INJECTION: HCPCS | Performed by: INTERNAL MEDICINE

## 2020-07-09 PROCEDURE — 25500064 ZZH RX 255 OP 636: Performed by: INTERNAL MEDICINE

## 2020-07-09 PROCEDURE — 94621 CARDIOPULM EXERCISE TESTING: CPT

## 2020-07-09 RX ORDER — GADOBUTROL 604.72 MG/ML
10 INJECTION INTRAVENOUS ONCE
Status: COMPLETED | OUTPATIENT
Start: 2020-07-09 | End: 2020-07-09

## 2020-07-09 RX ADMIN — GADOBUTROL 10 ML: 604.72 INJECTION INTRAVENOUS at 10:45

## 2020-07-13 NOTE — PROGRESS NOTES
Shelley Lennox, MD    [facility]   [address] [place] [address]      Yelena Tripathi M.D.  [place], [place]     RE:      Nancy Summers   MRN:   [id number]   :   1950      I performed a video visit with this patient's permission.  The patient was at home. I was in my office.  The duration of the visit was 20    minutes from       12:15 to 12:35                      .         Plan:  1. Continue current medical plan including medical, weight loss, evaluation for ZAINAB if not done, regular exercise  2. Follow-up echocardiogram and 6 minute walk test in 6 - 8 months, sooner if not doing well  3. Diuretics 20mgs prn for fluid retention  4.  Marked improvement from 1 year ago with current walking 3.5-4.0 miles per day       Impression:  1. Exertional shortness of breath  2. Exercise induced pulmonary hypertension  3. HFpEF  4. ZAINAB    The patient returns for follow-up of exertionally related PH and HFpEF.  There is no interim history of chest pain, tightness, paroxysmal nocturnal dyspnea, orthopnea, peripheral edema, palpitation, pre-syncope, syncope, device discharge.  Exercise tolerance is stable.  The patient is attempting to exercise regularly and following a sodium restricted, calorically appropriate diet.  Medications are reviewed and the patient is taking medications as prescribed.  The patient is generally sleeping well.     The patient is markedly improved with carefully and consistently observed regimen of weight loss and regular exercise, consistent use of CPAP..She does retain fluid on some occasions.         Alert, oriented, JVP within normal limits, normal mentation gait and station, no edema       Wt Readings from Last 24 Encounters:   20 83.9 kg (185 lb)   20 85.4 kg (188 lb 3.2 oz)   10/23/19 87.1 kg (192 lb 1.6 oz)   19 96.2 kg (212 lb)   19 96.2 kg (212 lb)   19 96.3 kg (212 lb 6.4 oz)          Current Outpatient Medications    Medication     Acetaminophen (TYLENOL EXTRA STRENGTH PO)     gabapentin (NEURONTIN) 100 MG capsule     tadalafil, PAH, 20 MG TABS      No current facility-administered medications for this visit.       Catherization  Baseline:  RA       5  RV       30/7  PA       28/12 (20)  PCWP 15  PA%     73.8%  TDCO/CI:  7.1 l/min and 3.6 l/min/m2  Measured Dorota CO/CI: 6.2 l/min and 3.1  l/min/m2  PVR 0.8 COSTA     Exercise - 25 kerr for 4 minutes 20 seconds  HR:   BP:   RA:      10   PA:      48/28 (39)  PCWP 29 with V-wave to 37  PA%:   47.7%  Measured Dorota 12.3 CO/CI: l/min and  6.1 l/min/m2  PVR: 0.8 COSTA     Conclusion:     1. Normal PA Pressure at rest with normal biventricular filling pressures and CO  2. Exercise induced increase in PCWP suggestive of HFpEF  3. Mild exercise induced PH due to HFpEF  4. Improvement in resting and exercise pulmonary vascular hemodyanmics on tadalafil therapy    Laboratories  Results for BULMARO BYRNES (MRN [id number]) as of 7/13/2020 09:11   Ref. Range 10/23/2019 11:36 7/1/2020 09:11   Sodium Latest Ref Range: 133 - 144 mmol/L 140 140   Potassium Latest Ref Range: 3.4 - 5.3 mmol/L 4.1 4.6   Chloride Latest Ref Range: 94 - 109 mmol/L 108 110 (H)   Carbon Dioxide Latest Ref Range: 20 - 32 mmol/L 26 27   Urea Nitrogen Latest Ref Range: 7 - 30 mg/dL 27 29   Creatinine Latest Ref Range: 0.52 - 1.04 mg/dL 0.80 0.94   GFR Estimate Latest Ref Range: >60 mL/min/1.73_m2 75 61   GFR Estimate If Black Latest Ref Range: >60 mL/min/1.73_m2 87 71   Calcium Latest Ref Range: 8.5 - 10.1 mg/dL 8.8 9.0   Anion Gap Latest Ref Range: 3 - 14 mmol/L 7 4   Albumin Latest Ref Range: 3.4 - 5.0 g/dL  4.0   Protein Total Latest Ref Range: 6.8 - 8.8 g/dL  7.2   Bilirubin Total Latest Ref Range: 0.2 - 1.3 mg/dL  0.6   Alkaline Phosphatase Latest Ref Range: 40 - 150 U/L  70   ALT Latest Ref Range: 0 - 50 U/L  34   AST Latest Ref Range: 0 - 45 U/L  26   N-Terminal Pro Bnp Latest Ref Range: 0 - 125 pg/mL 141 (H)     Glucose Latest Ref Range: 70 - 99 mg/dL 89 95   WBC Latest Ref Range: 4.0 - 11.0 10e9/L 8.2 5.6   Hemoglobin Latest Ref Range: 11.7 - 15.7 g/dL 13.1 13.3   Hematocrit Latest Ref Range: 35.0 - 47.0 % 40.2 42.0   Platelet Count Latest Ref Range: 150 - 450 10e9/L 220 213   RBC Count Latest Ref Range: 3.8 - 5.2 10e12/L 4.53 4.65   MCV Latest Ref Range: 78 - 100 fl 89 90   MCH Latest Ref Range: 26.5 - 33.0 pg 28.9 28.6   MCHC Latest Ref Range: 31.5 - 36.5 g/dL 32.6 31.7   RDW Latest Ref Range: 10.0 - 15.0 % 14.6 14.6   Diff Method Unknown  Automated Method   % Neutrophils Latest Units: %  66.6   % Lymphocytes Latest Units: %  23.3   % Monocytes Latest Units: %  7.7   % Eosinophils Latest Units: %  1.4   % Basophils Latest Units: %  0.5   % Immature Granulocytes Latest Units: %  0.5   Nucleated RBCs Latest Ref Range: 0 /100  0   Absolute Neutrophil Latest Ref Range: 1.6 - 8.3 10e9/L  3.7   Absolute Lymphocytes Latest Ref Range: 0.8 - 5.3 10e9/L  1.3   Absolute Monocytes Latest Ref Range: 0.0 - 1.3 10e9/L  0.4   Absolute Eosinophils Latest Ref Range: 0.0 - 0.7 10e9/L  0.1   Absolute Basophils Latest Ref Range: 0.0 - 0.2 10e9/L  0.0   Abs Immature Granulocytes Latest Ref Range: 0 - 0.4 10e9/L  0.0   Absolute Nucleated RBC Unknown  0.0         Cardiac MRI      1. The left ventricle is normal in cavity size and wall thickness. There are no regional wall motion  abnormalities. The global systolic function is normal. The LVEF is 67%.     2. The right ventricle is normal in cavity size. The global systolic function is normal. The RVEF is 60%.      3. Both atria are normal in size.     4. There is no significant valvular disease.      5. There is no late gadolinium enhancement to indicate myocardial fibrosis or infiltrative disease.      6. There is no pericardial effusion or thickening.     7. There is no intracardiac thrombus.     CONCLUSIONS:   Normal biventricular size and function with LVEF 67% and RVEF 60% and no  myocardial fibrosis.      CORE EXAM

## 2020-07-14 ENCOUNTER — VIRTUAL VISIT (OUTPATIENT)
Dept: CARDIOLOGY | Facility: CLINIC | Age: 70
End: 2020-07-14
Payer: MEDICARE

## 2020-07-14 DIAGNOSIS — I27.20 PULMONARY HYPERTENSION (H): Primary | ICD-10-CM

## 2020-07-14 DIAGNOSIS — R06.09 DOE (DYSPNEA ON EXERTION): ICD-10-CM

## 2020-07-14 PROCEDURE — 99213 OFFICE O/P EST LOW 20 MIN: CPT | Mod: 95 | Performed by: INTERNAL MEDICINE

## 2020-07-14 NOTE — PROGRESS NOTES
"Nancy Summers is a 70 year old female who is being evaluated via a billable telephone visit.      The patient has been notified of following:     \"This telephone visit will be conducted via a call between you and your physician/provider. We have found that certain health care needs can be provided without the need for a physical exam.  This service lets us provide the care you need with a short phone conversation.  If a prescription is necessary we can send it directly to your pharmacy.  If lab work is needed we can place an order for that and you can then stop by our lab to have the test done at a later time.    Telephone visits are billed at different rates depending on your insurance coverage. During this emergency period, for some insurers they may be billed the same as an in-person visit.  Please reach out to your insurance provider with any questions.    If during the course of the call the physician/provider feels a telephone visit is not appropriate, you will not be charged for this service.\"  Vitals - Patient Reported  Systolic (Patient Reported): 111  Diastolic (Patient Reported): 62  Weight (Patient Reported): 85.7 kg (189 lb)  SpO2 (Patient Reported): 95(room air)  Pulse (Patient Reported): 60    Review Of Systems  Skin: NEGATIVE  Eyes:Ears/Nose/Throat: NEGATIVE  Respiratory: XIE, yesterday SOB all day at rest, sleep apnea, wears cpap at night  Cardiovascular:  Edema in ankles, weight increase 3.2# yesterday, did not take PRN furosemide, would like to discuss  Gastrointestinal: NEGATIVE  Genitourinary:NEGATIVE   Musculoskeletal: NEGATIVE  Neurologic: NEGATIVE  Psychiatric: NEGATIVE  Hematologic/Lymphatic/Immunologic: NEGATIVE  Endocrine:  NEGATIVE    Margoth Dukes LPN          "

## 2020-07-14 NOTE — LETTER
2020    Yelena Tripathi MD  [facility] [address]  [facility] [address]    RE: Nancy Summers       Dear Colleague,    I had the pleasure of seeing Nancy Summers in the [facility].      Shelley Lennox, MD    [facility]   [address] [place] [address]      Yelena Kendrick Tripathi M.D.  [place], [place]     RE:      Nancy Summers   MRN:   [id number]   :   1950      I performed a video visit with this patient's permission.  The patient was at home. I was in my office.  The duration of the visit was 20    minutes from       12:15 to 12:35                      .         Plan:  1. Continue current medical plan including medical, weight loss, evaluation for ZAINAB if not done, regular exercise  2. Follow-up echocardiogram and 6 minute walk test in 6 - 8 months, sooner if not doing well  3. Diuretics 20mgs prn for fluid retention  4.  Marked improvement from 1 year ago with current walking 3.5-4.0 miles per day       Impression:  1. Exertional shortness of breath  2. Exercise induced pulmonary hypertension  3. HFpEF  4. ZAINAB    The patient returns for follow-up of exertionally related PH and HFpEF.  There is no interim history of chest pain, tightness, paroxysmal nocturnal dyspnea, orthopnea, peripheral edema, palpitation, pre-syncope, syncope, device discharge.  Exercise tolerance is stable.  The patient is attempting to exercise regularly and following a sodium restricted, calorically appropriate diet.  Medications are reviewed and the patient is taking medications as prescribed.  The patient is generally sleeping well.     The patient is markedly improved with carefully and consistently observed regimen of weight loss and regular exercise, consistent use of CPAP..She does retain fluid on some occasions.         Alert, oriented, JVP within normal limits, normal mentation gait and station, no edema       Wt Readings  from Last 24 Encounters:   07/01/20 83.9 kg (185 lb)   05/20/20 85.4 kg (188 lb 3.2 oz)   10/23/19 87.1 kg (192 lb 1.6 oz)   08/28/19 96.2 kg (212 lb)   08/21/19 96.2 kg (212 lb)   07/17/19 96.3 kg (212 lb 6.4 oz)          Current Outpatient Medications   Medication     Acetaminophen (TYLENOL EXTRA STRENGTH PO)     gabapentin (NEURONTIN) 100 MG capsule     tadalafil, PAH, 20 MG TABS      No current facility-administered medications for this visit.       Catherization  Baseline:  RA       5  RV       30/7  PA       28/12 (20)  PCWP 15  PA%     73.8%  TDCO/CI:  7.1 l/min and 3.6 l/min/m2  Measured Dorota CO/CI: 6.2 l/min and 3.1  l/min/m2  PVR 0.8 COSTA     Exercise - 25 kerr for 4 minutes 20 seconds  HR:   BP:   RA:      10   PA:      48/28 (39)  PCWP 29 with V-wave to 37  PA%:   47.7%  Measured Dorota 12.3 CO/CI: l/min and  6.1 l/min/m2  PVR: 0.8 COSTA     Conclusion:     1. Normal PA Pressure at rest with normal biventricular filling pressures and CO  2. Exercise induced increase in PCWP suggestive of HFpEF  3. Mild exercise induced PH due to HFpEF  4. Improvement in resting and exercise pulmonary vascular hemodyanmics on tadalafil therapy    Laboratories  Results for BULMARO BYRNES (MRN [id number]) as of 7/13/2020 09:11   Ref. Range 10/23/2019 11:36 7/1/2020 09:11   Sodium Latest Ref Range: 133 - 144 mmol/L 140 140   Potassium Latest Ref Range: 3.4 - 5.3 mmol/L 4.1 4.6   Chloride Latest Ref Range: 94 - 109 mmol/L 108 110 (H)   Carbon Dioxide Latest Ref Range: 20 - 32 mmol/L 26 27   Urea Nitrogen Latest Ref Range: 7 - 30 mg/dL 27 29   Creatinine Latest Ref Range: 0.52 - 1.04 mg/dL 0.80 0.94   GFR Estimate Latest Ref Range: >60 mL/min/1.73_m2 75 61   GFR Estimate If Black Latest Ref Range: >60 mL/min/1.73_m2 87 71   Calcium Latest Ref Range: 8.5 - 10.1 mg/dL 8.8 9.0   Anion Gap Latest Ref Range: 3 - 14 mmol/L 7 4   Albumin Latest Ref Range: 3.4 - 5.0 g/dL  4.0   Protein Total Latest Ref Range: 6.8 - 8.8 g/dL  7.2    Bilirubin Total Latest Ref Range: 0.2 - 1.3 mg/dL  0.6   Alkaline Phosphatase Latest Ref Range: 40 - 150 U/L  70   ALT Latest Ref Range: 0 - 50 U/L  34   AST Latest Ref Range: 0 - 45 U/L  26   N-Terminal Pro Bnp Latest Ref Range: 0 - 125 pg/mL 141 (H)    Glucose Latest Ref Range: 70 - 99 mg/dL 89 95   WBC Latest Ref Range: 4.0 - 11.0 10e9/L 8.2 5.6   Hemoglobin Latest Ref Range: 11.7 - 15.7 g/dL 13.1 13.3   Hematocrit Latest Ref Range: 35.0 - 47.0 % 40.2 42.0   Platelet Count Latest Ref Range: 150 - 450 10e9/L 220 213   RBC Count Latest Ref Range: 3.8 - 5.2 10e12/L 4.53 4.65   MCV Latest Ref Range: 78 - 100 fl 89 90   MCH Latest Ref Range: 26.5 - 33.0 pg 28.9 28.6   MCHC Latest Ref Range: 31.5 - 36.5 g/dL 32.6 31.7   RDW Latest Ref Range: 10.0 - 15.0 % 14.6 14.6   Diff Method Unknown  Automated Method   % Neutrophils Latest Units: %  66.6   % Lymphocytes Latest Units: %  23.3   % Monocytes Latest Units: %  7.7   % Eosinophils Latest Units: %  1.4   % Basophils Latest Units: %  0.5   % Immature Granulocytes Latest Units: %  0.5   Nucleated RBCs Latest Ref Range: 0 /100  0   Absolute Neutrophil Latest Ref Range: 1.6 - 8.3 10e9/L  3.7   Absolute Lymphocytes Latest Ref Range: 0.8 - 5.3 10e9/L  1.3   Absolute Monocytes Latest Ref Range: 0.0 - 1.3 10e9/L  0.4   Absolute Eosinophils Latest Ref Range: 0.0 - 0.7 10e9/L  0.1   Absolute Basophils Latest Ref Range: 0.0 - 0.2 10e9/L  0.0   Abs Immature Granulocytes Latest Ref Range: 0 - 0.4 10e9/L  0.0   Absolute Nucleated RBC Unknown  0.0         Cardiac MRI      1. The left ventricle is normal in cavity size and wall thickness. There are no regional wall motion  abnormalities. The global systolic function is normal. The LVEF is 67%.     2. The right ventricle is normal in cavity size. The global systolic function is normal. The RVEF is 60%.      3. Both atria are normal in size.     4. There is no significant valvular disease.      5. There is no late gadolinium enhancement  to indicate myocardial fibrosis or infiltrative disease.      6. There is no pericardial effusion or thickening.     7. There is no intracardiac thrombus.     CONCLUSIONS:   Normal biventricular size and function with LVEF 67% and RVEF 60% and no myocardial fibrosis.      CORE EXAM      Thank you for allowing me to participate in the care of your patient.    Sincerely,     Emigdio Vann MD     [facility]

## 2020-07-15 NOTE — NURSING NOTE
Pts plan of care per Emigdio Vann MD:    Plan:  1. Continue current medical plan including medical, weight loss, evaluation for ZAINAB if not done, regular exercise  2. Follow-up echocardiogram and 6 minute walk test in 6 - 8 months, sooner if not doing well  3. Diuretics 20mgs prn for fluid retention  4.  Marked improvement from 1 year ago with current walking 3.5-4.0 miles per day    Orders have been placed and pt marked ready for check out.  Cecilia Mills RN on 7/15/2020 at 3:21 PM

## 2020-11-05 ENCOUNTER — DOCUMENTATION ONLY (OUTPATIENT)
Dept: CARE COORDINATION | Facility: CLINIC | Age: 70
End: 2020-11-05

## 2021-01-03 ENCOUNTER — HEALTH MAINTENANCE LETTER (OUTPATIENT)
Age: 71
End: 2021-01-03

## 2021-04-25 ENCOUNTER — HEALTH MAINTENANCE LETTER (OUTPATIENT)
Age: 71
End: 2021-04-25

## 2021-05-29 ENCOUNTER — RECORDS - HEALTHEAST (OUTPATIENT)
Dept: ADMINISTRATIVE | Facility: CLINIC | Age: 71
End: 2021-05-29

## 2021-06-01 ENCOUNTER — RECORDS - HEALTHEAST (OUTPATIENT)
Dept: ADMINISTRATIVE | Facility: CLINIC | Age: 71
End: 2021-06-01

## 2021-06-02 ENCOUNTER — RECORDS - HEALTHEAST (OUTPATIENT)
Dept: ADMINISTRATIVE | Facility: CLINIC | Age: 71
End: 2021-06-02

## 2021-10-10 ENCOUNTER — HEALTH MAINTENANCE LETTER (OUTPATIENT)
Age: 71
End: 2021-10-10

## 2022-05-21 ENCOUNTER — HEALTH MAINTENANCE LETTER (OUTPATIENT)
Age: 72
End: 2022-05-21

## 2022-09-18 ENCOUNTER — HEALTH MAINTENANCE LETTER (OUTPATIENT)
Age: 72
End: 2022-09-18

## 2023-01-29 ENCOUNTER — HEALTH MAINTENANCE LETTER (OUTPATIENT)
Age: 73
End: 2023-01-29

## 2023-06-04 ENCOUNTER — HEALTH MAINTENANCE LETTER (OUTPATIENT)
Age: 73
End: 2023-06-04

## 2024-07-31 NOTE — TELEPHONE ENCOUNTER
I spoke with Dr. Vann and he wanted to speak with the pt.  She was scheduled for a virtual visit with him today.  Cecilia Mills RN on 5/20/2020 at 12:53 PM     I spoke with the pt and she stated that she is having some increased shortness of breath, chest tightness in the morning and some edema in her ankles.  She stated that on Saturday night she had scallops, tru food, feel that it may have been higher in sodium.  Cecilia Mills RN on 5/20/2020 at 10:08 AM        I received a call from the pt and she stated that her weight was increasing the last 3 days by 4.2 lbs.  I called the pt and was unable to reach her.  I left a message requesting to call me back.  Cecilia Mills RN on 5/20/2020 at 9:46 AM   [Consultation] : a consultation visit

## (undated) DEVICE — INTRO SHEATH 7FRX10CM PINNACLE RSS702

## (undated) DEVICE — Device

## (undated) DEVICE — INTRODUCER SHEATH 4FRX40CM MICROPUNC PED G47946

## (undated) DEVICE — PACK HEART RIGHT CUSTOM SAN32RHF18

## (undated) DEVICE — PACK HEART LEFT CUSTOM

## (undated) RX ORDER — LIDOCAINE 40 MG/G
CREAM TOPICAL
Status: DISPENSED
Start: 2020-07-01

## (undated) RX ORDER — LIDOCAINE 40 MG/G
CREAM TOPICAL
Status: DISPENSED
Start: 2019-08-21

## (undated) RX ORDER — LORAZEPAM 0.5 MG/1
TABLET ORAL
Status: DISPENSED
Start: 2020-07-01

## (undated) RX ORDER — LIDOCAINE HYDROCHLORIDE 10 MG/ML
INJECTION, SOLUTION EPIDURAL; INFILTRATION; INTRACAUDAL; PERINEURAL
Status: DISPENSED
Start: 2019-08-21